# Patient Record
Sex: FEMALE | Race: BLACK OR AFRICAN AMERICAN | Employment: FULL TIME | URBAN - METROPOLITAN AREA
[De-identification: names, ages, dates, MRNs, and addresses within clinical notes are randomized per-mention and may not be internally consistent; named-entity substitution may affect disease eponyms.]

---

## 2017-03-29 ENCOUNTER — ANESTHESIA (OUTPATIENT)
Dept: SURGERY | Age: 52
End: 2017-03-29
Payer: COMMERCIAL

## 2017-03-29 ENCOUNTER — HOSPITAL ENCOUNTER (OUTPATIENT)
Age: 52
Setting detail: OUTPATIENT SURGERY
Discharge: HOME | End: 2017-03-29
Attending: ORTHOPAEDIC SURGERY | Admitting: ORTHOPAEDIC SURGERY
Payer: COMMERCIAL

## 2017-03-29 ENCOUNTER — ANESTHESIA EVENT (OUTPATIENT)
Dept: SURGERY | Age: 52
End: 2017-03-29
Payer: COMMERCIAL

## 2017-03-29 VITALS
OXYGEN SATURATION: 93 % | BODY MASS INDEX: 32.75 KG/M2 | SYSTOLIC BLOOD PRESSURE: 115 MMHG | RESPIRATION RATE: 16 BRPM | WEIGHT: 209.13 LBS | TEMPERATURE: 97.9 F | HEART RATE: 77 BPM | DIASTOLIC BLOOD PRESSURE: 79 MMHG

## 2017-03-29 LAB
HCG UR QL: NEGATIVE
POTASSIUM BLD-SCNC: 3.6 MMOL/L (ref 3.5–5.1)

## 2017-03-29 PROCEDURE — 76942 ECHO GUIDE FOR BIOPSY: CPT | Performed by: ORTHOPAEDIC SURGERY

## 2017-03-29 PROCEDURE — 81025 URINE PREGNANCY TEST: CPT

## 2017-03-29 PROCEDURE — 77030019940 HC BLNKT HYPOTHRM STRY -B: Performed by: ANESTHESIOLOGY

## 2017-03-29 PROCEDURE — 76060000032 HC ANESTHESIA 0.5 TO 1 HR: Performed by: ORTHOPAEDIC SURGERY

## 2017-03-29 PROCEDURE — 76210000063 HC OR PH I REC FIRST 0.5 HR: Performed by: ORTHOPAEDIC SURGERY

## 2017-03-29 PROCEDURE — 77030020754 HC CUF TRNQT 2BLA STRY -B: Performed by: ORTHOPAEDIC SURGERY

## 2017-03-29 PROCEDURE — 77030003602 HC NDL NRV BLK BBMI -B: Performed by: ANESTHESIOLOGY

## 2017-03-29 PROCEDURE — 77030013921: Performed by: ORTHOPAEDIC SURGERY

## 2017-03-29 PROCEDURE — 76210000020 HC REC RM PH II FIRST 0.5 HR: Performed by: ORTHOPAEDIC SURGERY

## 2017-03-29 PROCEDURE — 74011250636 HC RX REV CODE- 250/636: Performed by: ANESTHESIOLOGY

## 2017-03-29 PROCEDURE — 74011250636 HC RX REV CODE- 250/636: Performed by: ORTHOPAEDIC SURGERY

## 2017-03-29 PROCEDURE — 76010010054 HC POST OP PAIN BLOCK: Performed by: ORTHOPAEDIC SURGERY

## 2017-03-29 PROCEDURE — 84132 ASSAY OF SERUM POTASSIUM: CPT

## 2017-03-29 PROCEDURE — 77030002933 HC SUT MCRYL J&J -A: Performed by: ORTHOPAEDIC SURGERY

## 2017-03-29 PROCEDURE — 77030002916 HC SUT ETHLN J&J -A: Performed by: ORTHOPAEDIC SURGERY

## 2017-03-29 PROCEDURE — 74011250636 HC RX REV CODE- 250/636

## 2017-03-29 PROCEDURE — 77030011640 HC PAD GRND REM COVD -A: Performed by: ORTHOPAEDIC SURGERY

## 2017-03-29 PROCEDURE — 77030018836 HC SOL IRR NACL ICUM -A: Performed by: ORTHOPAEDIC SURGERY

## 2017-03-29 PROCEDURE — 76010000160 HC OR TIME 0.5 TO 1 HR INTENSV-TIER 1: Performed by: ORTHOPAEDIC SURGERY

## 2017-03-29 RX ORDER — ROPIVACAINE HYDROCHLORIDE 5 MG/ML
INJECTION, SOLUTION EPIDURAL; INFILTRATION; PERINEURAL AS NEEDED
Status: DISCONTINUED | OUTPATIENT
Start: 2017-03-29 | End: 2017-03-29 | Stop reason: HOSPADM

## 2017-03-29 RX ORDER — FENTANYL CITRATE 50 UG/ML
100 INJECTION, SOLUTION INTRAMUSCULAR; INTRAVENOUS ONCE
Status: COMPLETED | OUTPATIENT
Start: 2017-03-29 | End: 2017-03-29

## 2017-03-29 RX ORDER — MIDAZOLAM HYDROCHLORIDE 1 MG/ML
2 INJECTION, SOLUTION INTRAMUSCULAR; INTRAVENOUS ONCE
Status: COMPLETED | OUTPATIENT
Start: 2017-03-29 | End: 2017-03-29

## 2017-03-29 RX ORDER — CEFAZOLIN SODIUM IN 0.9 % NACL 2 G/50 ML
2 INTRAVENOUS SOLUTION, PIGGYBACK (ML) INTRAVENOUS ONCE
Status: COMPLETED | OUTPATIENT
Start: 2017-03-29 | End: 2017-03-29

## 2017-03-29 RX ORDER — OXYCODONE HYDROCHLORIDE 5 MG/1
5 TABLET ORAL
Status: CANCELLED | OUTPATIENT
Start: 2017-03-29

## 2017-03-29 RX ORDER — PROPOFOL 10 MG/ML
INJECTION, EMULSION INTRAVENOUS
Status: DISCONTINUED | OUTPATIENT
Start: 2017-03-29 | End: 2017-03-29 | Stop reason: HOSPADM

## 2017-03-29 RX ORDER — SODIUM CHLORIDE 0.9 % (FLUSH) 0.9 %
5-10 SYRINGE (ML) INJECTION AS NEEDED
Status: CANCELLED | OUTPATIENT
Start: 2017-03-29

## 2017-03-29 RX ORDER — HYDROMORPHONE HYDROCHLORIDE 2 MG/ML
0.5 INJECTION, SOLUTION INTRAMUSCULAR; INTRAVENOUS; SUBCUTANEOUS
Status: CANCELLED | OUTPATIENT
Start: 2017-03-29

## 2017-03-29 RX ORDER — SODIUM CHLORIDE, SODIUM LACTATE, POTASSIUM CHLORIDE, CALCIUM CHLORIDE 600; 310; 30; 20 MG/100ML; MG/100ML; MG/100ML; MG/100ML
75 INJECTION, SOLUTION INTRAVENOUS CONTINUOUS
Status: DISCONTINUED | OUTPATIENT
Start: 2017-03-29 | End: 2017-03-29 | Stop reason: HOSPADM

## 2017-03-29 RX ORDER — OXYCODONE AND ACETAMINOPHEN 10; 325 MG/1; MG/1
1 TABLET ORAL AS NEEDED
Status: CANCELLED | OUTPATIENT
Start: 2017-03-29

## 2017-03-29 RX ADMIN — FENTANYL CITRATE 100 MCG: 50 INJECTION, SOLUTION INTRAMUSCULAR; INTRAVENOUS at 10:54

## 2017-03-29 RX ADMIN — ROPIVACAINE HYDROCHLORIDE 5 ML: 5 INJECTION, SOLUTION EPIDURAL; INFILTRATION; PERINEURAL at 10:59

## 2017-03-29 RX ADMIN — MIDAZOLAM HYDROCHLORIDE 2 MG: 1 INJECTION, SOLUTION INTRAMUSCULAR; INTRAVENOUS at 10:54

## 2017-03-29 RX ADMIN — PROPOFOL 200 MCG/KG/MIN: 10 INJECTION, EMULSION INTRAVENOUS at 11:51

## 2017-03-29 RX ADMIN — SODIUM CHLORIDE, SODIUM LACTATE, POTASSIUM CHLORIDE, AND CALCIUM CHLORIDE 75 ML/HR: 600; 310; 30; 20 INJECTION, SOLUTION INTRAVENOUS at 10:50

## 2017-03-29 RX ADMIN — CEFAZOLIN 2 G: 1 INJECTION, POWDER, FOR SOLUTION INTRAMUSCULAR; INTRAVENOUS; PARENTERAL at 12:00

## 2017-03-29 NOTE — H&P
Outpatient Surgery History and Physical:  Indiana Pollock was seen and examined. CHIEF COMPLAINT:   foot. PE:     Visit Vitals    LMP 01/27/2017       Heart:   Regular rhythm      Lungs:  Are clear      Past Medical History: There are no active problems to display for this patient. Surgical History:   Past Surgical History:   Procedure Laterality Date    HX COLONOSCOPY         Social History: Patient  reports that she has been smoking. She has a 14.50 pack-year smoking history. She has never used smokeless tobacco. She reports that she drinks alcohol. She reports that she does not use illicit drugs. Family History:   Family History   Problem Relation Age of Onset    Heart Disease Mother        Allergies: Reviewed per EMR  Allergies   Allergen Reactions    Lisinopril Unknown (comments)       Medications:    No current facility-administered medications on file prior to encounter. Current Outpatient Prescriptions on File Prior to Encounter   Medication Sig    ASPIRIN LOW DOSE PO 81 mg every morning. Tablet, Oral      hydroxychloroquine (PLAQUENIL) 200 mg tablet Take 200 mg by mouth every morning.  fluticasone (FLONASE) 50 mcg/actuation nasal spray by Nasal route.  levothyroxine (SYNTHROID) 50 mcg tablet Take 50 mcg by mouth Daily (before breakfast).  gabapentin (NEURONTIN) 800 mg tablet Take 800 mg by mouth every morning.  buPROPion XL (WELLBUTRIN XL) 300 mg XL tablet Take 300 mg by mouth every morning.  traZODone (DESYREL) 50 mg tablet Take 50 mg by mouth nightly.  potassium chloride (KLOR-CON) 20 mEq packet Take 20 mEq by mouth.  losartan (COZAAR) 100 mg tablet Take 100 mg by mouth every morning.  amlodipine (NORVASC) 10 mg tablet Take 10 mg by mouth every morning. The surgery is planned for the foot. History and physical has been reviewed. The patient has been examined.  There have been no significant clinical changes since the completion of the originally dated History and Physical.      The patient is here today for outpatient surgery. I have examined the patient, no changes are noted in the patient's medical status. Necessity for the procedure/care is still present and the history and physical above is current. See the office notes for the full long term history of the problem. Please see the recent office notes for the musculoskeletal examination.     Signed By: Mallika Estevez MD     March 29, 2017 10:38 AM

## 2017-03-29 NOTE — ANESTHESIA PROCEDURE NOTES
Peripheral Block    Start time: 3/29/2017 10:54 AM  End time: 3/29/2017 10:57 AM  Performed by: Ivy Watson by: Jermaine Broussarders:    Indications: at surgeon's request and post-op pain management    Preanesthetic Checklist: patient identified, risks and benefits discussed, site marked, timeout performed, anesthesia consent given and patient being monitored    Timeout Time: 10:54          Block Type:   Block Type:  Popliteal  Laterality:  Right  Monitoring:  Standard ASA monitoring, continuous pulse ox, frequent vital sign checks, heart rate, responsive to questions and oxygen  Injection Technique:  Single shot  Procedures: ultrasound guided and nerve stimulator    Patient Position: left lateral decubitus  Prep: chlorhexidine    Needle Type:  Stimuplex  Needle Gauge:  21 G  Needle Localization:  Anatomical landmarks, nerve stimulator and ultrasound guidance  Motor Response: minimal motor response >0.4 mA    Medication Injected:  0.5%  ropivacaine  Volume (mL):  35    Assessment:  Number of attempts:  1  Injection Assessment:  Incremental injection every 5 mL, local visualized surrounding nerve on ultrasound, negative aspiration for blood and no intravascular symptoms  Patient tolerance:  Patient tolerated the procedure well with no immediate complications

## 2017-03-29 NOTE — ANESTHESIA PREPROCEDURE EVALUATION
Anesthetic History               Review of Systems / Medical History  Patient summary reviewed and pertinent labs reviewed    Pulmonary    COPD: mild    Sleep apnea  Smoker         Neuro/Psych   Within defined limits           Cardiovascular    Hypertension: well controlled              Exercise tolerance: >4 METS     GI/Hepatic/Renal     GERD: well controlled           Endo/Other      Hypothyroidism: well controlled  Arthritis     Other Findings              Physical Exam    Airway  Mallampati: II  TM Distance: > 6 cm  Neck ROM: normal range of motion   Mouth opening: Normal     Cardiovascular    Rhythm: regular           Dental    Dentition: Poor dentition     Pulmonary                 Abdominal  GI exam deferred       Other Findings            Anesthetic Plan    ASA: 2  Anesthesia type: total IV anesthesia - popliteal fossa block and saphenous block      Post-op pain plan if not by surgeon: peripheral nerve block single    Induction: Intravenous  Anesthetic plan and risks discussed with: Patient

## 2017-03-29 NOTE — ANESTHESIA POSTPROCEDURE EVALUATION
Post-Anesthesia Evaluation and Assessment    Patient: Mony Kowalski MRN: [id number]  SSN: [id number]    YOB: 1965  Age: 54 y.o. Sex: female       Cardiovascular Function/Vital Signs  Visit Vitals    /79    Pulse 77    Temp 36.6 °C (97.9 °F)    Resp 16    Wt 94.9 kg (209 lb 2 oz)    SpO2 93%    BMI 32.75 kg/m2       Patient is status post total IV anesthesia anesthesia for Procedure(s):  TARSAL TUNNEL RELEASE  RIGHT PLANTAR FASCIA RELEASE/ SPUR EXOSTECTOMY/ .    Nausea/Vomiting: None    Postoperative hydration reviewed and adequate. Pain:  Pain Scale 1: Numeric (0 - 10) (03/29/17 1259)  Pain Intensity 1: 0 (03/29/17 1259)   Managed    Neurological Status:   Neuro (WDL): Exceptions to WDL (03/29/17 1259)  Neuro  Neurologic State: Alert (03/29/17 1259)  LUE Motor Response: Purposeful (03/29/17 1259)  LLE Motor Response: Purposeful (03/29/17 1259)  RUE Motor Response: Purposeful (03/29/17 1259)  RLE Motor Response: Numbness; Pharmacologically paralyzed (03/29/17 1259)   At baseline    Mental Status and Level of Consciousness: Arousable    Pulmonary Status:   O2 Device: Room air (03/29/17 1259)   Adequate oxygenation and airway patent    Complications related to anesthesia: None    Post-anesthesia assessment completed.  No concerns    Signed By: Niko Amado MD     March 29, 2017

## 2017-03-29 NOTE — IP AVS SNAPSHOT
[address] 
[phone] Patient: Emiliano Montenegro MRN: [id number] :1965 You are allergic to the following Allergen Reactions Lisinopril Unknown (comments) Recent Documentation Weight BMI OB Status Smoking Status 94.9 kg 32.75 kg/m2 Premenopausal Current Every Day Smoker Emergency Contacts Name Discharge Info Relation Home Work Mobile Idania Sharma  Parent [1] [phone] About your hospitalization You were admitted on:  2017 You last received care in the:  [facility] OP PACU You were discharged on:  2017 Unit phone number:  [phone] Why you were hospitalized Your primary diagnosis was:  Not on File Providers Seen During Your Hospitalizations Provider Role Specialty Primary office phone Xin Ortez MD Attending Provider Orthopedic Surgery [phone] Your Primary Care Physician (PCP) Primary Care Physician Office Phone Office Fax Florian Phillips [phone] Follow-up Information None Current Discharge Medication List  
  
ASK your doctor about these medications Dose & Instructions Dispensing Information Comments Morning Noon Evening Bedtime ASPIRIN LOW DOSE PO Your last dose was: Your next dose is:    
   
   
 Dose:  81 mg  
81 mg every morning. Tablet, Oral  
 Refills:  0  
     
   
   
   
  
 FLONASE 50 mcg/actuation nasal spray Generic drug:  fluticasone Your last dose was: Your next dose is:    
   
   
 by Nasal route. Refills:  0  
     
   
   
   
  
 gabapentin 800 mg tablet Commonly known as:  NEURONTIN Your last dose was: Your next dose is:    
   
   
 Dose:  800 mg Take 800 mg by mouth every morning. Refills:  0  KLOR-CON 20 mEq packet Generic drug:  potassium chloride Your last dose was: Your next dose is:    
   
   
 Dose:  20 mEq Take 20 mEq by mouth. Refills:  0  
     
   
   
   
  
 losartan 100 mg tablet Commonly known as:  COZAAR Your last dose was: Your next dose is:    
   
   
 Dose:  100 mg Take 100 mg by mouth every morning. Refills:  0 NORVASC 10 mg tablet Generic drug:  amLODIPine Your last dose was: Your next dose is:    
   
   
 Dose:  10 mg Take 10 mg by mouth every morning. Refills:  0  
     
   
   
   
  
 PLAQUENIL 200 mg tablet Generic drug:  hydroxychloroquine Your last dose was: Your next dose is:    
   
   
 Dose:  200 mg Take 200 mg by mouth every morning. Refills:  0  
     
   
   
   
  
 synthroid 50 mcg tablet Generic drug:  levothyroxine Your last dose was: Your next dose is:    
   
   
 Dose:  50 mcg Take 50 mcg by mouth Daily (before breakfast). Refills:  0  
     
   
   
   
  
 traZODone 50 mg tablet Commonly known as:  Oletta Justa Your last dose was: Your next dose is:    
   
   
 Dose:  50 mg Take 50 mg by mouth nightly. Refills:  0 WELLBUTRIN  mg XL tablet Generic drug:  buPROPion XL Your last dose was: Your next dose is:    
   
   
 Dose:  300 mg Take 300 mg by mouth every morning. Refills:  0 Discharge Instructions INSTRUCTIONS FOLLOWING FOOT SURGERY 
 
ACTIVITY Elevate foot. No Ice No weight bearing. Use crutches or knee walker until seen in follow up appointment Don't put anything into the splint to relieve itching etc. Take one 325mg aspirin daily if okay with your medical doctor and you have no GI ulcer. Get up and out of bed frequently. While in bed move the legs as much as possible) DIET Day of Surgery: Clear liquids until no nausea or vomiting; then light, bland diet (Baked chicken, plain rice, grits, scrambled egg, toast). Nothing Greasy, fried or spicy today Advance to regular diet on second day, unless your doctor orders otherwise. PAIN Take pain medications as directed by your doctor. Call your doctor if pain is NOT relieved by medication. PAIN MED SIDE EFFECTS Constipation: Lots of fluids, try prune juice, then OTC stool softeners if necessary Nausea: Take medication with food. DRESSING CARE Keep dry and in place until follow up appointment CALL YOUR DOCTOR IF YOU HAVE Excessive bleeding that does not stop after holding mild pressure over the area. Temperature of 101 degrees or above. Redness, excessive swelling or bruising, and/or green or yellow, smelly discharge from incision. Loss of sensation - cold, white or blue toes. AFTER ANESTHESIA For the first 24 hours and while taking narcotics for pain: DO NOT Drive, Drink Alcoholic beverages, or make important Decisions. Be aware of dizziness following anesthesia and while taking pain medication. Preventing Infection at Home We care about preventing infection and avoiding the spread of germs  not only when you are in the hospital but also when you return home. When you return home from the hospital, its important to take the following steps to help prevent infection and avoid spreading germs that could infect you and others. Ask everyone in your home to follow these guidelines, too. Clean Your Hands · Clean your hands whenever your hands are visibly dirty, before you eat, before or after touching your mouth, nose or eyes, and before preparing food. Clean them after contact with body fluids, using the restroom, touching animals or changing diapers. · When washing hands, wet them with warm water and work up a lather. Rub hands for at least 15 seconds, then rinse them and pat them dry with a clean towel or paper towel.  
· When using hand sanitizers, it should take about 15 seconds to rub your hands dry. If not, you probably didnt apply enough . Cover Your Sneeze or Cough Germs are released into the air whenever you sneeze or cough. To prevent the spread of infection: · Turn away from other people before coughing or sneezing. · Cover your mouth or nose with a tissue when you cough or sneeze. Put the tissue in the trash. · If you dont have a tissue, cough or sneeze into your upper sleeve, not your hands. · Always clean your hands after coughing or sneezing. Care for Wounds Your skin is your bodys first line of defense against germs, but an open wound leaves an easy way for germs to enter your body. To prevent infection: · Clean your hands before and after changing wound dressings, and wear gloves to change dressings if recommended by your doctor. · Take special care with IV lines or other devices inserted into the body. If you must touch them, clean your hands first. 
· Follow any specific instructions from your doctor to care for your wounds. Contact your doctor if you experience any signs of infection, such as fever or increased redness at the surgical or wound site. Keep a [address] · Clean or wipe commonly touched hard surfaces like door handles, sinks, tabletops, phones and TV remotes. · Use products labeled disinfectant to kill harmful bacteria and viruses. · Use a clean cloth or paper towel to clean and dry surfaces. Wiping surfaces with a dirty dishcloth, sponge or towel will only spread germs. · Never share toothbrushes, sifuentes, drinking glasses, utensils, razor blades, face cloths or bath towels to avoid spreading germs. · Be sure that the linens that you sleep on are clean. · Keep pets away from wounds and wash your hands after touching pets, their toys or bedding. We care about you and your health. Remember, preventing infections is a team effort between you, your family, friends and health care providers. APPOINTMENT DATE/TIME Keep as scheduled YOUR DOCTOR'S PHONE NUMBER [phone] DISCHARGE SUMMARY from Nurse PATIENT INSTRUCTIONS: 
 
After general anesthesia or intravenous sedation, for 24 hours or while taking prescription Narcotics: · Limit your activities · Do not drive and operate hazardous machinery · Do not make important personal or business decisions · Do  not drink alcoholic beverages · If you have not urinated within 8 hours after discharge, please contact your surgeon on call. *  Please give a list of your current medications to your Primary Care Provider. *  Please update this list whenever your medications are discontinued, doses are 
    changed, or new medications (including over-the-counter products) are added. *  Please carry medication information at all times in case of emergency situations. These are general instructions for a healthy lifestyle: No smoking/ No tobacco products/ Avoid exposure to second hand smoke Surgeon General's Warning:  Quitting smoking now greatly reduces serious risk to your health. Obesity, smoking, and sedentary lifestyle greatly increases your risk for illness A healthy diet, regular physical exercise & weight monitoring are important for maintaining a healthy lifestyle You may be retaining fluid if you have a history of heart failure or if you experience any of the following symptoms:  Weight gain of 3 pounds or more overnight or 5 pounds in a week, increased swelling in our hands or feet or shortness of breath while lying flat in bed. Please call your doctor as soon as you notice any of these symptoms; do not wait until your next office visit. Recognize signs and symptoms of STROKE: 
 
F-face looks uneven A-arms unable to move or move unevenly S-speech slurred or non-existent T-time-call 911 as soon as signs and symptoms begin-DO NOT go Back to bed or wait to see if you get better-TIME IS BRAIN. Discharge Orders None Introducing [facility]! [employer] [place] introduces [employer] patient portal. Now you can access parts of your medical record, email your doctor's office, and request medication refills online. 1. In your internet browser, go to [email or web address] 2. Click on the First Time User? Click Here link in the Sign In box. You will see the New Member Sign Up page. 3. Enter your [employer] Access Code exactly as it appears below. You will not need to use this code after youve completed the sign-up process. If you do not sign up before the expiration date, you must request a new code. · [employer] Access Code: [email or web address] Expires: 6/19/2017  1:50 PM 
 
4. Enter the last four digits of your Social Security Number (xxxx) and Date of Birth (mm/dd/yyyy) as indicated and click Submit. You will be taken to the next sign-up page. 5. Create a [employer] ID. This will be your [employer] login ID and cannot be changed, so think of one that is secure and easy to remember. 6. Create a [employer] password. You can change your password at any time. 7. Enter your Password Reset Question and Answer. This can be used at a later time if you forget your password. 8. Enter your e-mail address. You will receive e-mail notification when new information is available in [address] 9. Click Sign Up. You can now view and download portions of your medical record. 10. Click the Download Summary menu link to download a portable copy of your medical information. If you have questions, please visit the Frequently Asked Questions section of the [employer] website. Remember, [employer] is NOT to be used for urgent needs. For medical emergencies, dial 911. Now available from your iPhone and Android! General Information Please provide this summary of care documentation to your next provider.  
  
  
    
    
 Patient Signature: ____________________________________________________________ Date:  ____________________________________________________________  
  
Justo Sport Provider Signature:  ____________________________________________________________ Date:  ____________________________________________________________

## 2017-03-29 NOTE — OP NOTES
[address] [facility]   OPERATIVE REPORT       Name:  Rodrigo Sommers   MR#:  [id number]   :  1965   Account #:  [de-identified]   Date of Adm:  2017       PREOPERATIVE DIAGNOSES   1. Right tarsal tunnel syndrome. 2. Right plantar fasciitis with plantar heel spur. POSTOPERATIVE DIAGNOSES   1. Right tarsal tunnel syndrome. 2. Right plantar fasciitis with plantar heel spur. PROCEDURES   1. Right tarsal tunnel release. 2. Right plantar fascia release with plantar heel exostectomy. SURGEON: Daniel Holder MD    ANESTHESIA: Regional.    FLUIDS: Crystalloid. ESTIMATED BLOOD LOSS: Minimal.    SPECIMENS: None. DRAINS: None. COMPLICATIONS: None. TOURNIQUET TIME: Less than an hour. FINDINGS INTRAOPERATIVELY: The patient had significant   tightening around the tarsal tunnel, beginning about 1 cm   proximal to the abductor fascia and extending throughout the   abductor fascia. This encompassed the entire tarsal tunnel, as   well as the first branch of the lateral plantar nerve. SURGERY IN DETAIL: After successful induction of regional   anesthesia, the right leg was scrubbed, prepped, and draped in   the usual sterile fashion. Antibiotic issued. Time out procedure   and identification of surgical markings were done by me. The   right leg was addressed with a medial approach. Findings as   noted above were seen. I was able to do a complete tarsal tunnel   release and complete plantar fascia release through proximal and   distal incisions. The plantar heel had a small spur present and   this was resected. The wound was thoroughly cleaned and closed   with Monocryl and Ethilon. The patient was placed in a sterile   dressing and seemed to tolerate the procedures well.         MD HELGA Guadarrama / ADRIANA   D:  2017   12:47   T:  2017   14:21   Job #:  [id number]

## 2017-03-29 NOTE — DISCHARGE INSTRUCTIONS
INSTRUCTIONS FOLLOWING FOOT SURGERY    ACTIVITY  Elevate foot. No Ice    No weight bearing. Use crutches or knee walker until seen in follow up appointment  Don't put anything into the splint to relieve itching etc. Take one 325mg aspirin daily if okay with your medical doctor and you have no GI ulcer. Get up and out of bed frequently. While in bed move the legs as much as possible)    DIET  Day of Surgery: Clear liquids until no nausea or vomiting; then light, bland diet (Baked chicken, plain rice, grits, scrambled egg, toast). Nothing Greasy, fried or spicy today  Advance to regular diet on second day, unless your doctor orders otherwise. PAIN  Take pain medications as directed by your doctor. Call your doctor if pain is NOT relieved by medication. PAIN MED SIDE EFFECTS  Constipation: Lots of fluids, try prune juice, then OTC stool softeners if necessary  Nausea: Take medication with food. DRESSING CARE Keep dry and in place until follow up appointment    CALL YOUR DOCTOR IF YOU HAVE  Excessive bleeding that does not stop after holding mild pressure over the area. Temperature of 101 degrees or above. Redness, excessive swelling or bruising, and/or green or yellow, smelly discharge from incision. Loss of sensation - cold, white or blue toes. AFTER ANESTHESIA  For the first 24 hours and while taking narcotics for pain: DO NOT Drive, Drink Alcoholic beverages, or make important Decisions. Be aware of dizziness following anesthesia and while taking pain medication. Preventing Infection at Home  We care about preventing infection and avoiding the spread of germs - not only when you are in the hospital but also when you return home. When you return home from the hospital, its important to take the following steps to help prevent infection and avoid spreading germs that could infect you and others. Ask everyone in your home to follow these guidelines, too.     Clean Your Hands  · Clean your hands whenever your hands are visibly dirty, before you eat, before or after touching your mouth, nose or eyes, and before preparing food. Clean them after contact with body fluids, using the restroom, touching animals or changing diapers. · When washing hands, wet them with warm water and work up a lather. Rub hands for at least 15 seconds, then rinse them and pat them dry with a clean towel or paper towel. · When using hand sanitizers, it should take about 15 seconds to rub your hands dry. If not, you probably didnt apply enough . Cover Your Sneeze or Cough  Germs are released into the air whenever you sneeze or cough. To prevent the spread of infection:  · Turn away from other people before coughing or sneezing. · Cover your mouth or nose with a tissue when you cough or sneeze. Put the tissue in the trash. · If you dont have a tissue, cough or sneeze into your upper sleeve, not your hands. · Always clean your hands after coughing or sneezing. Care for Wounds  Your skin is your bodys first line of defense against germs, but an open wound leaves an easy way for germs to enter your body. To prevent infection:  · Clean your hands before and after changing wound dressings, and wear gloves to change dressings if recommended by your doctor. · Take special care with IV lines or other devices inserted into the body. If you must touch them, clean your hands first.  · Follow any specific instructions from your doctor to care for your wounds. Contact your doctor if you experience any signs of infection, such as fever or increased redness at the surgical or wound site. Keep a Clean Home  · Clean or wipe commonly touched hard surfaces like door handles, sinks, tabletops, phones and TV remotes. · Use products labeled disinfectant to kill harmful bacteria and viruses. · Use a clean cloth or paper towel to clean and dry surfaces.  Wiping surfaces with a dirty dishcloth, sponge or towel will only spread germs.  · Never share toothbrushes, sifuentes, drinking glasses, utensils, razor blades, face cloths or bath towels to avoid spreading germs. · Be sure that the linens that you sleep on are clean. · Keep pets away from wounds and wash your hands after touching pets, their toys or bedding. We care about you and your health. Remember, preventing infections is a team effort between you, your family, friends and health care providers. APPOINTMENT DATE/TIME Keep as scheduled    YOUR DOCTOR'S PHONE NUMBER [phone]      DISCHARGE SUMMARY from Nurse    PATIENT INSTRUCTIONS:    After general anesthesia or intravenous sedation, for 24 hours or while taking prescription Narcotics:  · Limit your activities  · Do not drive and operate hazardous machinery  · Do not make important personal or business decisions  · Do  not drink alcoholic beverages  · If you have not urinated within 8 hours after discharge, please contact your surgeon on call. *  Please give a list of your current medications to your Primary Care Provider. *  Please update this list whenever your medications are discontinued, doses are      changed, or new medications (including over-the-counter products) are added. *  Please carry medication information at all times in case of emergency situations. These are general instructions for a healthy lifestyle:    No smoking/ No tobacco products/ Avoid exposure to second hand smoke    Surgeon General's Warning:  Quitting smoking now greatly reduces serious risk to your health.     Obesity, smoking, and sedentary lifestyle greatly increases your risk for illness    A healthy diet, regular physical exercise & weight monitoring are important for maintaining a healthy lifestyle    You may be retaining fluid if you have a history of heart failure or if you experience any of the following symptoms:  Weight gain of 3 pounds or more overnight or 5 pounds in a week, increased swelling in our hands or feet or shortness of breath while lying flat in bed. Please call your doctor as soon as you notice any of these symptoms; do not wait until your next office visit. Recognize signs and symptoms of STROKE:    F-face looks uneven    A-arms unable to move or move unevenly    S-speech slurred or non-existent    T-time-call 911 as soon as signs and symptoms begin-DO NOT go       Back to bed or wait to see if you get better-TIME IS BRAIN.

## 2019-11-15 ENCOUNTER — HOSPITAL ENCOUNTER (EMERGENCY)
Age: 54
Discharge: HOME | End: 2019-11-15
Attending: EMERGENCY MEDICINE
Payer: SELF-PAY

## 2019-11-15 ENCOUNTER — APPOINTMENT (OUTPATIENT)
Dept: GENERAL RADIOLOGY | Age: 54
End: 2019-11-15
Attending: NURSE PRACTITIONER
Payer: SELF-PAY

## 2019-11-15 VITALS
WEIGHT: 200 LBS | SYSTOLIC BLOOD PRESSURE: 151 MMHG | HEART RATE: 78 BPM | DIASTOLIC BLOOD PRESSURE: 89 MMHG | OXYGEN SATURATION: 96 % | TEMPERATURE: 98.6 F | BODY MASS INDEX: 31.39 KG/M2 | RESPIRATION RATE: 16 BRPM | HEIGHT: 67 IN

## 2019-11-15 DIAGNOSIS — S81.801A OPEN WOUND OF RIGHT LOWER EXTREMITY, INITIAL ENCOUNTER: Primary | ICD-10-CM

## 2019-11-15 DIAGNOSIS — L03.115 CELLULITIS OF RIGHT LOWER EXTREMITY: ICD-10-CM

## 2019-11-15 LAB
ALBUMIN SERPL-MCNC: 2.8 G/DL (ref 3.5–5)
ALBUMIN/GLOB SERPL: 0.7 {RATIO} (ref 1.2–3.5)
ALP SERPL-CCNC: 78 U/L (ref 50–136)
ALT SERPL-CCNC: 18 U/L (ref 12–65)
ANION GAP SERPL CALC-SCNC: 5 MMOL/L (ref 7–16)
AST SERPL-CCNC: 18 U/L (ref 15–37)
BASOPHILS # BLD: 0 K/UL (ref 0–0.2)
BASOPHILS NFR BLD: 0 % (ref 0–2)
BILIRUB SERPL-MCNC: 0.5 MG/DL (ref 0.2–1.1)
BUN SERPL-MCNC: 18 MG/DL (ref 6–23)
CALCIUM SERPL-MCNC: 8 MG/DL (ref 8.3–10.4)
CHLORIDE SERPL-SCNC: 111 MMOL/L (ref 98–107)
CO2 SERPL-SCNC: 28 MMOL/L (ref 21–32)
CREAT SERPL-MCNC: 1.18 MG/DL (ref 0.6–1)
DIFFERENTIAL METHOD BLD: NORMAL
EOSINOPHIL # BLD: 0.1 K/UL (ref 0–0.8)
EOSINOPHIL NFR BLD: 1 % (ref 0.5–7.8)
ERYTHROCYTE [DISTWIDTH] IN BLOOD BY AUTOMATED COUNT: 13 % (ref 11.9–14.6)
GLOBULIN SER CALC-MCNC: 4.1 G/DL (ref 2.3–3.5)
GLUCOSE SERPL-MCNC: 111 MG/DL (ref 65–100)
HCT VFR BLD AUTO: 41.2 % (ref 35.8–46.3)
HGB BLD-MCNC: 13.2 G/DL (ref 11.7–15.4)
IMM GRANULOCYTES # BLD AUTO: 0 K/UL (ref 0–0.5)
IMM GRANULOCYTES NFR BLD AUTO: 0 % (ref 0–5)
LACTATE BLD-SCNC: 0.9 MMOL/L (ref 0.5–1.9)
LYMPHOCYTES # BLD: 1 K/UL (ref 0.5–4.6)
LYMPHOCYTES NFR BLD: 17 % (ref 13–44)
MCH RBC QN AUTO: 27.6 PG (ref 26.1–32.9)
MCHC RBC AUTO-ENTMCNC: 32 G/DL (ref 31.4–35)
MCV RBC AUTO: 86.2 FL (ref 79.6–97.8)
MONOCYTES # BLD: 0.4 K/UL (ref 0.1–1.3)
MONOCYTES NFR BLD: 6 % (ref 4–12)
NEUTS SEG # BLD: 4.5 K/UL (ref 1.7–8.2)
NEUTS SEG NFR BLD: 75 % (ref 43–78)
NRBC # BLD: 0 K/UL (ref 0–0.2)
PLATELET # BLD AUTO: 231 K/UL (ref 150–450)
PMV BLD AUTO: 12.1 FL (ref 9.4–12.3)
POTASSIUM SERPL-SCNC: 3 MMOL/L (ref 3.5–5.1)
PROT SERPL-MCNC: 6.9 G/DL (ref 6.3–8.2)
RBC # BLD AUTO: 4.78 M/UL (ref 4.05–5.2)
SODIUM SERPL-SCNC: 144 MMOL/L (ref 136–145)
WBC # BLD AUTO: 6 K/UL (ref 4.3–11.1)

## 2019-11-15 PROCEDURE — 83605 ASSAY OF LACTIC ACID: CPT

## 2019-11-15 PROCEDURE — 73590 X-RAY EXAM OF LOWER LEG: CPT

## 2019-11-15 PROCEDURE — 74011250637 HC RX REV CODE- 250/637: Performed by: NURSE PRACTITIONER

## 2019-11-15 PROCEDURE — 85025 COMPLETE CBC W/AUTO DIFF WBC: CPT

## 2019-11-15 PROCEDURE — 80053 COMPREHEN METABOLIC PANEL: CPT

## 2019-11-15 PROCEDURE — 99283 EMERGENCY DEPT VISIT LOW MDM: CPT | Performed by: NURSE PRACTITIONER

## 2019-11-15 RX ORDER — CLINDAMYCIN HYDROCHLORIDE 150 MG/1
300 CAPSULE ORAL 3 TIMES DAILY
Qty: 42 CAP | Refills: 0 | Status: SHIPPED | OUTPATIENT
Start: 2019-11-15 | End: 2019-11-22

## 2019-11-15 RX ORDER — TRAMADOL HYDROCHLORIDE 50 MG/1
50 TABLET ORAL
Qty: 12 TAB | Refills: 0 | Status: SHIPPED | OUTPATIENT
Start: 2019-11-15 | End: 2019-11-18

## 2019-11-15 RX ORDER — HYDROCODONE BITARTRATE AND ACETAMINOPHEN 5; 325 MG/1; MG/1
1 TABLET ORAL
Status: COMPLETED | OUTPATIENT
Start: 2019-11-15 | End: 2019-11-15

## 2019-11-15 RX ADMIN — HYDROCODONE BITARTRATE AND ACETAMINOPHEN 1 TABLET: 5; 325 TABLET ORAL at 13:47

## 2019-11-15 NOTE — ED TRIAGE NOTES
Pt ambulatory to triage without complications. Pt states went to urgent care for a recheck of a wound to the right lower leg. Pt states the packing came out and the area is not any better and sent her for IV ABT. Pt is not febrile or tachycardic. Pt denies fevers and reports bodyaches only.

## 2019-11-15 NOTE — DISCHARGE INSTRUCTIONS
Change your dressing 2 times a day. Wound care will follow up with your regarding management of the wound. Clindamycin as prescribed. Return to the emergency department for any new or worsening symptoms.

## 2019-11-15 NOTE — ED PROVIDER NOTES
Patient presents with an open wound to her right lower leg. She states she had an abscess opened 3 days ago. She states she was started on keflex. She states she returned to urgent care and had packing removed. She states the provider was concerned that she had a \"blood infection\". Patient states she was sent to the ED for IV antibiotics. She denies fever. The history is provided by the patient. Past Medical History:   Diagnosis Date    Arthritis     Chronic obstructive pulmonary disease (HCC)     no meds    GERD (gastroesophageal reflux disease)     no meds    Hypertension     Kidney stone     Sleep apnea     does not use cpap    Thyroid disease        Past Surgical History:   Procedure Laterality Date    HX COLONOSCOPY           Family History:   Problem Relation Age of Onset    Heart Disease Mother        Social History     Socioeconomic History    Marital status:      Spouse name: Not on file    Number of children: Not on file    Years of education: Not on file    Highest education level: Not on file   Occupational History    Not on file   Social Needs    Financial resource strain: Not on file    Food insecurity:     Worry: Not on file     Inability: Not on file    Transportation needs:     Medical: Not on file     Non-medical: Not on file   Tobacco Use    Smoking status: Current Every Day Smoker     Packs/day: 0.50     Years: 29.00     Pack years: 14.50    Smokeless tobacco: Never Used    Tobacco comment: \"Start in her 25s and smokes 1/2 ppd. \"   Substance and Sexual Activity    Alcohol use: Yes     Comment: occasionally    Drug use: No    Sexual activity: Not on file   Lifestyle    Physical activity:     Days per week: Not on file     Minutes per session: Not on file    Stress: Not on file   Relationships    Social connections:     Talks on phone: Not on file     Gets together: Not on file     Attends Denominational service: Not on file     Active member of club or organization: Not on file     Attends meetings of clubs or organizations: Not on file     Relationship status: Not on file    Intimate partner violence:     Fear of current or ex partner: Not on file     Emotionally abused: Not on file     Physically abused: Not on file     Forced sexual activity: Not on file   Other Topics Concern    Not on file   Social History Narrative    Not on file         ALLERGIES: Lisinopril    Review of Systems   Constitutional: Negative for fever. Musculoskeletal: Positive for arthralgias. Skin: Positive for color change and wound. Vitals:    11/15/19 1256   BP: 151/89   Pulse: 78   Resp: 16   Temp: 98.6 °F (37 °C)   SpO2: 96%   Weight: 90.7 kg (200 lb)   Height: 5' 7\" (1.702 m)            Physical Exam   Constitutional: She is oriented to person, place, and time. She appears well-developed and well-nourished. No distress. HENT:   Head: Normocephalic and atraumatic. Eyes: Conjunctivae and EOM are normal.   Neck: Normal range of motion. Neck supple. Cardiovascular: Normal rate and regular rhythm. Pulses:       Posterior tibial pulses are 2+ on the right side. Pulmonary/Chest: Effort normal and breath sounds normal.   Musculoskeletal:        Right lower leg: She exhibits tenderness and swelling. She exhibits no deformity and no laceration. Neurological: She is alert and oriented to person, place, and time. Skin: Skin is warm and dry. She is not diaphoretic. There is erythema. Psychiatric: She has a normal mood and affect. Her behavior is normal.   Nursing note and vitals reviewed.      Recent Results (from the past 12 hour(s))   CBC WITH AUTOMATED DIFF    Collection Time: 11/15/19  1:00 PM   Result Value Ref Range    WBC 6.0 4.3 - 11.1 K/uL    RBC 4.78 4.05 - 5.2 M/uL    HGB 13.2 11.7 - 15.4 g/dL    HCT 41.2 35.8 - 46.3 %    MCV 86.2 79.6 - 97.8 FL    MCH 27.6 26.1 - 32.9 PG    MCHC 32.0 31.4 - 35.0 g/dL    RDW 13.0 11.9 - 14.6 %    PLATELET 467 229 - 682 K/uL    MPV 12.1 9.4 - 12.3 FL    ABSOLUTE NRBC 0.00 0.0 - 0.2 K/uL    DF AUTOMATED      NEUTROPHILS 75 43 - 78 %    LYMPHOCYTES 17 13 - 44 %    MONOCYTES 6 4.0 - 12.0 %    EOSINOPHILS 1 0.5 - 7.8 %    BASOPHILS 0 0.0 - 2.0 %    IMMATURE GRANULOCYTES 0 0.0 - 5.0 %    ABS. NEUTROPHILS 4.5 1.7 - 8.2 K/UL    ABS. LYMPHOCYTES 1.0 0.5 - 4.6 K/UL    ABS. MONOCYTES 0.4 0.1 - 1.3 K/UL    ABS. EOSINOPHILS 0.1 0.0 - 0.8 K/UL    ABS. BASOPHILS 0.0 0.0 - 0.2 K/UL    ABS. IMM. GRANS. 0.0 0.0 - 0.5 K/UL   POC LACTIC ACID    Collection Time: 11/15/19  1:02 PM   Result Value Ref Range    Lactic Acid (POC) 0.90 0.5 - 1.9 mmol/L   METABOLIC PANEL, COMPREHENSIVE    Collection Time: 11/15/19  1:59 PM   Result Value Ref Range    Sodium 144 136 - 145 mmol/L    Potassium 3.0 (L) 3.5 - 5.1 mmol/L    Chloride 111 (H) 98 - 107 mmol/L    CO2 28 21 - 32 mmol/L    Anion gap 5 (L) 7 - 16 mmol/L    Glucose 111 (H) 65 - 100 mg/dL    BUN 18 6 - 23 MG/DL    Creatinine 1.18 (H) 0.6 - 1.0 MG/DL    GFR est AA >60 >60 ml/min/1.73m2    GFR est non-AA 51 (L) >60 ml/min/1.73m2    Calcium 8.0 (L) 8.3 - 10.4 MG/DL    Bilirubin, total 0.5 0.2 - 1.1 MG/DL    ALT (SGPT) 18 12 - 65 U/L    AST (SGOT) 18 15 - 37 U/L    Alk. phosphatase 78 50 - 136 U/L    Protein, total 6.9 6.3 - 8.2 g/dL    Albumin 2.8 (L) 3.5 - 5.0 g/dL    Globulin 4.1 (H) 2.3 - 3.5 g/dL    A-G Ratio 0.7 (L) 1.2 - 3.5       Xr Tib/fib Rt    Result Date: 11/15/2019  RIGHT TIB-FIB 2 view(s). HISTORY: Leg wound proximal. TECHNIQUE: AP and lateral and oblique views. COMPARISON: None. FINDINGS: No periostitis or erosions. No abnormal gas collections. No fracture. IMPRESSION: Negative. MDM  Number of Diagnoses or Management Options  Cellulitis of right lower extremity:   Open wound of right lower extremity, initial encounter:   Diagnosis management comments: No acute abnormalities noted on lab results. Xray negative for acute changes. Patient referred to wound care for further management. Prescription for clindamycin given at discharge.         Amount and/or Complexity of Data Reviewed  Clinical lab tests: ordered and reviewed  Tests in the radiology section of CPT®: ordered and reviewed  Tests in the medicine section of CPT®: ordered    Patient Progress  Patient progress: stable         Procedures

## 2019-11-15 NOTE — LETTER
[address] [facility] EMERGENCY DEPT 
[address] [facility] [address] 
[phone] Work/School Note Date: 11/15/2019 To Whom It May concern: 
 
Tamar Cai was seen and treated today in the emergency room by the following provider(s): 
Attending Provider: Te Jones MD 
Nurse Practitioner: SRUTHI Jonas. Tamar Cai {Return to school/sport/work:55996} Sincerely, 
 
 
 
 
Rossy Arboleda RN

## 2019-11-15 NOTE — ED NOTES
I have reviewed discharge instructions with the patient. The patient verbalized understanding. Patient left ED via Discharge Method: ambulatory to Home with (mother). Opportunity for questions and clarification provided. Patient given 2 scripts. To continue your aftercare when you leave the hospital, you may receive an automated call from our care team to check in on how you are doing. This is a free service and part of our promise to provide the best care and service to meet your aftercare needs.  If you have questions, or wish to unsubscribe from this service please call [phone]. Thank you for Choosing our [address] [place] [address] Emergency Department.

## 2019-11-16 ENCOUNTER — HOSPITAL ENCOUNTER (EMERGENCY)
Age: 54
Discharge: HOME | End: 2019-11-16
Attending: EMERGENCY MEDICINE
Payer: SELF-PAY

## 2019-11-16 VITALS
TEMPERATURE: 98.2 F | HEART RATE: 73 BPM | RESPIRATION RATE: 18 BRPM | HEIGHT: 67 IN | BODY MASS INDEX: 31.39 KG/M2 | WEIGHT: 200 LBS | SYSTOLIC BLOOD PRESSURE: 151 MMHG | DIASTOLIC BLOOD PRESSURE: 95 MMHG | OXYGEN SATURATION: 99 %

## 2019-11-16 DIAGNOSIS — H10.33 ACUTE CONJUNCTIVITIS OF BOTH EYES, UNSPECIFIED ACUTE CONJUNCTIVITIS TYPE: Primary | ICD-10-CM

## 2019-11-16 PROCEDURE — 99283 EMERGENCY DEPT VISIT LOW MDM: CPT | Performed by: NURSE PRACTITIONER

## 2019-11-16 RX ORDER — ERYTHROMYCIN 5 MG/G
OINTMENT OPHTHALMIC
Qty: 1 TUBE | Refills: 0 | Status: SHIPPED | OUTPATIENT
Start: 2019-11-16 | End: 2021-04-06 | Stop reason: ALTCHOICE

## 2019-11-16 NOTE — ED NOTES
I have reviewed discharge instructions with the patient. The patient verbalized understanding. Patient left ED via Discharge Method: ambulatory to Home with self. Opportunity for questions and clarification provided. Patient given 1 scripts. To continue your aftercare when you leave the hospital, you may receive an automated call from our care team to check in on how you are doing. This is a free service and part of our promise to provide the best care and service to meet your aftercare needs.  If you have questions, or wish to unsubscribe from this service please call [phone]. Thank you for Choosing our [facility] Emergency Department.

## 2019-11-16 NOTE — ED TRIAGE NOTES
Patient states bilat eye irritation. States itching and drainage began last night. States eyes and eyelids hurt. Also photophobia. No redness or drainage noted.

## 2019-11-16 NOTE — ED PROVIDER NOTES
Patient presents with bilateral eye drainage, bilateral eye itching, and photophobia. She states symptoms started today. She denies trauma to her eyes. She denies changes to her vision. She denies eye pain. The history is provided by the patient. Past Medical History:   Diagnosis Date    Arthritis     Chronic obstructive pulmonary disease (HCC)     no meds    GERD (gastroesophageal reflux disease)     no meds    Hypertension     Kidney stone     Sleep apnea     does not use cpap    Thyroid disease        Past Surgical History:   Procedure Laterality Date    HX COLONOSCOPY           Family History:   Problem Relation Age of Onset    Heart Disease Mother        Social History     Socioeconomic History    Marital status:      Spouse name: Not on file    Number of children: Not on file    Years of education: Not on file    Highest education level: Not on file   Occupational History    Not on file   Social Needs    Financial resource strain: Not on file    Food insecurity:     Worry: Not on file     Inability: Not on file    Transportation needs:     Medical: Not on file     Non-medical: Not on file   Tobacco Use    Smoking status: Current Every Day Smoker     Packs/day: 0.50     Years: 29.00     Pack years: 14.50    Smokeless tobacco: Never Used    Tobacco comment: \"Start in her 25s and smokes 1/2 ppd. \"   Substance and Sexual Activity    Alcohol use: Yes     Comment: occasionally    Drug use: No    Sexual activity: Not on file   Lifestyle    Physical activity:     Days per week: Not on file     Minutes per session: Not on file    Stress: Not on file   Relationships    Social connections:     Talks on phone: Not on file     Gets together: Not on file     Attends Zoroastrian service: Not on file     Active member of club or organization: Not on file     Attends meetings of clubs or organizations: Not on file     Relationship status: Not on file    Intimate partner violence: Fear of current or ex partner: Not on file     Emotionally abused: Not on file     Physically abused: Not on file     Forced sexual activity: Not on file   Other Topics Concern    Not on file   Social History Narrative    Not on file         ALLERGIES: Lisinopril    Review of Systems   Constitutional: Negative for chills and fever. Eyes: Positive for photophobia, discharge, redness and itching. Negative for pain and visual disturbance. Respiratory: Negative for cough. Vitals:    11/16/19 1501   BP: (!) 151/95   Pulse: 73   Resp: 18   Temp: 98.2 °F (36.8 °C)   SpO2: 99%   Weight: 90.7 kg (200 lb)   Height: 5' 7\" (1.702 m)            Physical Exam   Constitutional: She is oriented to person, place, and time. She appears well-developed and well-nourished. No distress. HENT:   Head: Normocephalic and atraumatic. Eyes: EOM are normal. Right eye exhibits discharge. Left eye exhibits discharge. Right conjunctiva is injected. Left conjunctiva is injected. Right eye exhibits no nystagmus. Left eye exhibits no nystagmus. Right pupil is round and reactive. Left pupil is round and reactive. Pupils are equal.   Neck: Normal range of motion. Neck supple. Cardiovascular: Normal rate and regular rhythm. Pulmonary/Chest: Effort normal and breath sounds normal.   Neurological: She is alert and oriented to person, place, and time. Skin: Skin is warm and dry. She is not diaphoretic. Psychiatric: She has a normal mood and affect. Her behavior is normal.   Nursing note and vitals reviewed. MDM  Number of Diagnoses or Management Options  Acute conjunctivitis of both eyes, unspecified acute conjunctivitis type:   Diagnosis management comments: No acute abnormality noted on visual acuity.      Patient Progress  Patient progress: stable         Procedures

## 2019-11-16 NOTE — DISCHARGE INSTRUCTIONS
Medication to both eyes. Follow up with your primary care provider for a recheck if symptoms fail to improve. Return to the emergency department for any new or worsening symptoms.

## 2019-11-18 ENCOUNTER — HOSPITAL ENCOUNTER (EMERGENCY)
Age: 54
Discharge: HOME | End: 2019-11-18
Attending: EMERGENCY MEDICINE
Payer: SELF-PAY

## 2019-11-18 ENCOUNTER — APPOINTMENT (OUTPATIENT)
Dept: GENERAL RADIOLOGY | Age: 54
End: 2019-11-18
Payer: SELF-PAY

## 2019-11-18 VITALS
TEMPERATURE: 98.3 F | RESPIRATION RATE: 18 BRPM | SYSTOLIC BLOOD PRESSURE: 145 MMHG | WEIGHT: 204 LBS | HEART RATE: 75 BPM | BODY MASS INDEX: 32.02 KG/M2 | OXYGEN SATURATION: 98 % | DIASTOLIC BLOOD PRESSURE: 90 MMHG | HEIGHT: 67 IN

## 2019-11-18 DIAGNOSIS — J01.00 ACUTE MAXILLARY SINUSITIS, RECURRENCE NOT SPECIFIED: Primary | ICD-10-CM

## 2019-11-18 PROCEDURE — 70220 X-RAY EXAM OF SINUSES: CPT

## 2019-11-18 PROCEDURE — 74011000250 HC RX REV CODE- 250: Performed by: PHYSICIAN ASSISTANT

## 2019-11-18 PROCEDURE — 99283 EMERGENCY DEPT VISIT LOW MDM: CPT | Performed by: EMERGENCY MEDICINE

## 2019-11-18 RX ORDER — TETRACAINE HYDROCHLORIDE 5 MG/ML
1 SOLUTION OPHTHALMIC
Status: COMPLETED | OUTPATIENT
Start: 2019-11-18 | End: 2019-11-18

## 2019-11-18 RX ORDER — METHYLPREDNISOLONE 4 MG/1
4 TABLET ORAL
Qty: 1 DOSE PACK | Refills: 0 | Status: SHIPPED | OUTPATIENT
Start: 2019-11-18 | End: 2021-04-06 | Stop reason: ALTCHOICE

## 2019-11-18 RX ORDER — AMOXICILLIN 875 MG/1
875 TABLET, FILM COATED ORAL 2 TIMES DAILY
Qty: 20 TAB | Refills: 0 | Status: SHIPPED | OUTPATIENT
Start: 2019-11-18 | End: 2019-12-02

## 2019-11-18 RX ADMIN — TETRACAINE HYDROCHLORIDE 1 DROP: 5 SOLUTION OPHTHALMIC at 17:11

## 2019-11-18 NOTE — ED TRIAGE NOTES
Patient presents with complaints of continued right eye pain. Pt states she was discharged with pink eye and sent with erythromycin, compliant with medication. Patient states she is still photophobic. Pt states eyes remain slightly blurry but it has not worsened since Friday when last seen. Right eye slightly pink. Pt is also requesting a work note.

## 2019-11-18 NOTE — LETTER
[address] [facility] EMERGENCY DEPT 
[address] [facility] [address] 
[phone] Work/School Note Date: 11/18/2019 To Whom It May concern: 
 
Dorothy Santizo was seen and treated today in the emergency room by the following provider(s): 
Attending Provider: Sanna Kim MD 
Physician Assistant: LI Jean. Dorothy Santizo may return to work on 11/20/19. Sincerely, LI Sanchez

## 2019-11-19 NOTE — ED PROVIDER NOTES
Patient is here with sinus pressure, congestion, headache and photophobia. She states she was here on Saturday and diagnosed with conjunctivitis. She was given erythromycin ointment which she has been using bilaterally since then. She states it has helped. She is not having any matting of her eyelashes or itching anymore. She still has sinus pressure and congestion. This is been going on for a while. No nausea or vomiting. No neck pain, chest pain, shortness of breath, abdominal pain, dizziness, weakness, dyspnea on exertion, orthopnea, swelling/tingling or weakness to her arms or legs or other new symptoms. She did ambulate to the stretcher without difficulty and is well-hydrated. The history is provided by the patient. Sinus Pain    This is a new problem. The current episode started more than 1 week ago. The problem has been gradually worsening. There has been no fever. The pain is at a severity of 6/10. The pain is moderate. Associated symptoms include congestion, sinus pressure, rhinorrhea and headaches. Pertinent negatives include no chills, no sweats, no ear pain, no hoarse voice, no sore throat, no swollen glands, no cough, no shortness of breath, no neck pain, no neck pain and no chest pain. She has tried nothing for the symptoms.         Past Medical History:   Diagnosis Date    Arthritis     Chronic obstructive pulmonary disease (HCC)     no meds    GERD (gastroesophageal reflux disease)     no meds    Hypertension     Kidney stone     Sleep apnea     does not use cpap    Thyroid disease        Past Surgical History:   Procedure Laterality Date    HX COLONOSCOPY           Family History:   Problem Relation Age of Onset    Heart Disease Mother        Social History     Socioeconomic History    Marital status:      Spouse name: Not on file    Number of children: Not on file    Years of education: Not on file    Highest education level: Not on file   Occupational History    Not on file   Social Needs    Financial resource strain: Not on file    Food insecurity:     Worry: Not on file     Inability: Not on file    Transportation needs:     Medical: Not on file     Non-medical: Not on file   Tobacco Use    Smoking status: Current Every Day Smoker     Packs/day: 0.50     Years: 29.00     Pack years: 14.50    Smokeless tobacco: Never Used    Tobacco comment: \"Start in her 25s and smokes 1/2 ppd. \"   Substance and Sexual Activity    Alcohol use: Yes     Comment: occasionally    Drug use: No    Sexual activity: Not on file   Lifestyle    Physical activity:     Days per week: Not on file     Minutes per session: Not on file    Stress: Not on file   Relationships    Social connections:     Talks on phone: Not on file     Gets together: Not on file     Attends Advent service: Not on file     Active member of club or organization: Not on file     Attends meetings of clubs or organizations: Not on file     Relationship status: Not on file    Intimate partner violence:     Fear of current or ex partner: Not on file     Emotionally abused: Not on file     Physically abused: Not on file     Forced sexual activity: Not on file   Other Topics Concern    Not on file   Social History Narrative    Not on file         ALLERGIES: Lisinopril    Review of Systems   Constitutional: Negative. Negative for chills. HENT: Positive for congestion, rhinorrhea, sinus pressure and sinus pain. Negative for ear pain, hoarse voice and sore throat. Eyes: Negative. Respiratory: Negative. Negative for cough and shortness of breath. Cardiovascular: Negative. Negative for chest pain. Gastrointestinal: Negative. Genitourinary: Negative. Musculoskeletal: Negative. Negative for neck pain. Skin: Negative. Neurological: Positive for headaches. Psychiatric/Behavioral: Negative. All other systems reviewed and are negative.       Vitals:    11/18/19 1626 11/18/19 1916   BP: (!) 150/97 145/90   Pulse: 71 75   Resp: 16 18   Temp: 98.4 °F (36.9 °C) 98.3 °F (36.8 °C)   SpO2: 97% 98%   Weight: 92.5 kg (204 lb)    Height: 5' 7\" (1.702 m)             Physical Exam   Constitutional: She is oriented to person, place, and time. She appears well-developed and well-nourished. HENT:   Head: Normocephalic and atraumatic. Right Ear: External ear normal.   Left Ear: External ear normal.   Mouth/Throat: Oropharynx is clear and moist.   There is bilateral maxillary sinus pressure to palpation. There is moderate swelling to the nasal turbinates bilaterally no swelling of the posterior pharynx. TM exam is normal.   Eyes: Pupils are equal, round, and reactive to light. Conjunctivae and EOM are normal.   The bilateral conjunctive are normal. PERRLA, EOMI. patient is opening eyes and looking around without difficulty in the light. Neck: Normal range of motion. Neck supple. Cardiovascular: Normal rate, regular rhythm, normal heart sounds and intact distal pulses. Pulmonary/Chest: Effort normal and breath sounds normal.   Abdominal: Soft. Bowel sounds are normal.   Musculoskeletal: Normal range of motion. Neurological: She is alert and oriented to person, place, and time. She has normal reflexes. She displays normal reflexes. No cranial nerve deficit or sensory deficit. She exhibits normal muscle tone. Coordination normal.   Skin: Skin is warm and dry. Psychiatric: She has a normal mood and affect. Her behavior is normal. Judgment and thought content normal.   Nursing note and vitals reviewed.        MDM  Number of Diagnoses or Management Options  Acute maxillary sinusitis, recurrence not specified:      Amount and/or Complexity of Data Reviewed  Tests in the radiology section of CPT®: ordered and reviewed    Risk of Complications, Morbidity, and/or Mortality  Presenting problems: moderate  Diagnostic procedures: moderate  Management options: moderate    Patient Progress  Patient progress: improved Procedures      The patient was observed in the ED. Results Reviewed:  XR SINUSES PARANASAL MIN 3 V   Final Result   IMPRESSION:  UNREMARKABLE EXAMINATION. Patient did not have relief from the tetracaine eyedrops and this does appear to be more related to her sinuses today. She has palpable tenderness to them. I will treat her for sinusitis. She was instructed to continue the erythromycin ointment as directed and finish that since she has started it. She should drink plenty of fluids, rest, a note for work was written for 2 days and she should return to the ED if worsening in any way. She is stable for discharge and ambulatory out of the ER without difficulty. I discussed the results of all labs, procedures, radiographs, and treatments with the patient and available family. Treatment plan is agreed upon and the patient is ready for discharge. All voiced understanding of the discharge plan and medication instructions or changes as appropriate. Questions about treatment in the ED were answered. All were encouraged to return should symptoms worsen or new problems develop.

## 2019-11-19 NOTE — DISCHARGE INSTRUCTIONS
Patient Education        Sinusitis: Care Instructions  Your Care Instructions    Sinusitis is an infection of the lining of the sinus cavities in your head. Sinusitis often follows a cold. It causes pain and pressure in your head and face. In most cases, sinusitis gets better on its own in 1 to 2 weeks. But some mild symptoms may last for several weeks. Sometimes antibiotics are needed. Follow-up care is a key part of your treatment and safety. Be sure to make and go to all appointments, and call your doctor if you are having problems. It's also a good idea to know your test results and keep a list of the medicines you take. How can you care for yourself at home? · Take an over-the-counter pain medicine, such as acetaminophen (Tylenol), ibuprofen (Advil, Motrin), or naproxen (Aleve). Read and follow all instructions on the label. · If the doctor prescribed antibiotics, take them as directed. Do not stop taking them just because you feel better. You need to take the full course of antibiotics. · Be careful when taking over-the-counter cold or flu medicines and Tylenol at the same time. Many of these medicines have acetaminophen, which is Tylenol. Read the labels to make sure that you are not taking more than the recommended dose. Too much acetaminophen (Tylenol) can be harmful. · Breathe warm, moist air from a steamy shower, a hot bath, or a sink filled with hot water. Avoid cold, dry air. Using a humidifier in your home may help. Follow the directions for cleaning the machine. · Use saline (saltwater) nasal washes to help keep your nasal passages open and wash out mucus and bacteria. You can buy saline nose drops at a grocery store or drugstore. Or you can make your own at home by adding 1 teaspoon of salt and 1 teaspoon of baking soda to 2 cups of distilled water. If you make your own, fill a bulb syringe with the solution, insert the tip into your nostril, and squeeze gently. Anthony Cardosos your nose.   · Put a hot, wet towel or a warm gel pack on your face 3 or 4 times a day for 5 to 10 minutes each time. · Try a decongestant nasal spray like oxymetazoline (Afrin). Do not use it for more than 3 days in a row. Using it for more than 3 days can make your congestion worse. When should you call for help? Call your doctor now or seek immediate medical care if:    · You have new or worse swelling or redness in your face or around your eyes.     · You have a new or higher fever.    Watch closely for changes in your health, and be sure to contact your doctor if:    · You have new or worse facial pain.     · The mucus from your nose becomes thicker (like pus) or has new blood in it.     · You are not getting better as expected. Where can you learn more? Go to http://ivana-melvi.info/. Enter D597 in the search box to learn more about \"Sinusitis: Care Instructions. \"  Current as of: October 21, 2018  Content Version: 12.2  © 6547-6680 [employer]. Care instructions adapted under license by [employer] (which disclaims liability or warranty for this information). If you have questions about a medical condition or this instruction, always ask your healthcare professional. [address] any warranty or liability for your use of this information.

## 2019-11-19 NOTE — ED NOTES
I have reviewed discharge instructions with the patient. The patient verbalized understanding. Patient left ED via Discharge Method: ambulatory to Home with self. Opportunity for questions and clarification provided. Patient given 2 scripts. To continue your aftercare when you leave the hospital, you may receive an automated call from our care team to check in on how you are doing. This is a free service and part of our promise to provide the best care and service to meet your aftercare needs.  If you have questions, or wish to unsubscribe from this service please call [phone]. Thank you for Choosing our [facility] Emergency Department.

## 2022-01-03 ENCOUNTER — APPOINTMENT (OUTPATIENT)
Dept: GENERAL RADIOLOGY | Age: 57
End: 2022-01-03
Attending: EMERGENCY MEDICINE
Payer: COMMERCIAL

## 2022-01-03 LAB
ALBUMIN SERPL-MCNC: 3.7 G/DL (ref 3.5–5)
ALBUMIN/GLOB SERPL: 0.8 {RATIO} (ref 1.2–3.5)
ALP SERPL-CCNC: 89 U/L (ref 50–136)
ALT SERPL-CCNC: 21 U/L (ref 12–65)
ANION GAP SERPL CALC-SCNC: 3 MMOL/L (ref 7–16)
AST SERPL-CCNC: 23 U/L (ref 15–37)
BASOPHILS # BLD: 0 K/UL (ref 0–0.2)
BASOPHILS NFR BLD: 0 % (ref 0–2)
BILIRUB SERPL-MCNC: 0.6 MG/DL (ref 0.2–1.1)
BUN SERPL-MCNC: 18 MG/DL (ref 6–23)
CALCIUM SERPL-MCNC: 9.6 MG/DL (ref 8.3–10.4)
CHLORIDE SERPL-SCNC: 108 MMOL/L (ref 98–107)
CO2 SERPL-SCNC: 30 MMOL/L (ref 21–32)
CREAT SERPL-MCNC: 1.38 MG/DL (ref 0.6–1)
DIFFERENTIAL METHOD BLD: NORMAL
EOSINOPHIL # BLD: 0.1 K/UL (ref 0–0.8)
EOSINOPHIL NFR BLD: 1 % (ref 0.5–7.8)
ERYTHROCYTE [DISTWIDTH] IN BLOOD BY AUTOMATED COUNT: 13.1 % (ref 11.9–14.6)
GLOBULIN SER CALC-MCNC: 4.5 G/DL (ref 2.3–3.5)
GLUCOSE SERPL-MCNC: 104 MG/DL (ref 65–100)
HCT VFR BLD AUTO: 41.8 % (ref 35.8–46.3)
HGB BLD-MCNC: 13.2 G/DL (ref 11.7–15.4)
IMM GRANULOCYTES # BLD AUTO: 0 K/UL (ref 0–0.5)
IMM GRANULOCYTES NFR BLD AUTO: 0 % (ref 0–5)
LIPASE SERPL-CCNC: 248 U/L (ref 73–393)
LYMPHOCYTES # BLD: 1 K/UL (ref 0.5–4.6)
LYMPHOCYTES NFR BLD: 14 % (ref 13–44)
MAGNESIUM SERPL-MCNC: 3.1 MG/DL (ref 1.8–2.4)
MCH RBC QN AUTO: 26.5 PG (ref 26.1–32.9)
MCHC RBC AUTO-ENTMCNC: 31.6 G/DL (ref 31.4–35)
MCV RBC AUTO: 83.8 FL (ref 79.6–97.8)
MONOCYTES # BLD: 0.5 K/UL (ref 0.1–1.3)
MONOCYTES NFR BLD: 8 % (ref 4–12)
NEUTS SEG # BLD: 5.2 K/UL (ref 1.7–8.2)
NEUTS SEG NFR BLD: 76 % (ref 43–78)
NRBC # BLD: 0 K/UL (ref 0–0.2)
PLATELET # BLD AUTO: 265 K/UL (ref 150–450)
PMV BLD AUTO: 10.7 FL (ref 9.4–12.3)
POTASSIUM SERPL-SCNC: 4.2 MMOL/L (ref 3.5–5.1)
PROT SERPL-MCNC: 8.2 G/DL (ref 6.3–8.2)
RBC # BLD AUTO: 4.99 M/UL (ref 4.05–5.2)
SODIUM SERPL-SCNC: 141 MMOL/L (ref 136–145)
TROPONIN-HIGH SENSITIVITY: 12.5 PG/ML (ref 0–14)
WBC # BLD AUTO: 6.8 K/UL (ref 4.3–11.1)

## 2022-01-03 PROCEDURE — 83690 ASSAY OF LIPASE: CPT

## 2022-01-03 PROCEDURE — 85025 COMPLETE CBC W/AUTO DIFF WBC: CPT

## 2022-01-03 PROCEDURE — 93005 ELECTROCARDIOGRAM TRACING: CPT | Performed by: EMERGENCY MEDICINE

## 2022-01-03 PROCEDURE — 99285 EMERGENCY DEPT VISIT HI MDM: CPT

## 2022-01-03 PROCEDURE — 94762 N-INVAS EAR/PLS OXIMTRY CONT: CPT

## 2022-01-03 PROCEDURE — 84484 ASSAY OF TROPONIN QUANT: CPT

## 2022-01-03 PROCEDURE — 80053 COMPREHEN METABOLIC PANEL: CPT

## 2022-01-03 PROCEDURE — 83735 ASSAY OF MAGNESIUM: CPT

## 2022-01-03 PROCEDURE — 71046 X-RAY EXAM CHEST 2 VIEWS: CPT

## 2022-01-03 RX ORDER — SODIUM CHLORIDE 0.9 % (FLUSH) 0.9 %
5-10 SYRINGE (ML) INJECTION AS NEEDED
Status: DISCONTINUED | OUTPATIENT
Start: 2022-01-03 | End: 2022-01-04 | Stop reason: HOSPADM

## 2022-01-03 RX ORDER — SODIUM CHLORIDE 0.9 % (FLUSH) 0.9 %
5-10 SYRINGE (ML) INJECTION EVERY 8 HOURS
Status: DISCONTINUED | OUTPATIENT
Start: 2022-01-03 | End: 2022-01-04 | Stop reason: HOSPADM

## 2022-01-03 NOTE — ED TRIAGE NOTES
Pt ambulatory to triage with CO midsternal CP with muscle spams going up her back that started after the CP. Denies cardiac hx. Reports SOB with CP denies nausea.

## 2022-01-04 ENCOUNTER — HOSPITAL ENCOUNTER (EMERGENCY)
Age: 57
Discharge: HOME | End: 2022-01-04
Attending: EMERGENCY MEDICINE
Payer: COMMERCIAL

## 2022-01-04 VITALS
HEIGHT: 67 IN | RESPIRATION RATE: 22 BRPM | WEIGHT: 215 LBS | OXYGEN SATURATION: 98 % | BODY MASS INDEX: 33.74 KG/M2 | TEMPERATURE: 97.6 F | HEART RATE: 71 BPM | SYSTOLIC BLOOD PRESSURE: 151 MMHG | DIASTOLIC BLOOD PRESSURE: 98 MMHG

## 2022-01-04 DIAGNOSIS — M62.838 MUSCLE SPASM: ICD-10-CM

## 2022-01-04 DIAGNOSIS — R07.9 CHEST PAIN, UNSPECIFIED TYPE: Primary | ICD-10-CM

## 2022-01-04 DIAGNOSIS — M54.9 MUSCULOSKELETAL BACK PAIN: ICD-10-CM

## 2022-01-04 LAB
ATRIAL RATE: 69 BPM
ATRIAL RATE: 85 BPM
CALCULATED P AXIS, ECG09: 39 DEGREES
CALCULATED P AXIS, ECG09: 40 DEGREES
CALCULATED R AXIS, ECG10: -35 DEGREES
CALCULATED R AXIS, ECG10: 10 DEGREES
CALCULATED T AXIS, ECG11: 33 DEGREES
CALCULATED T AXIS, ECG11: 38 DEGREES
DIAGNOSIS, 93000: NORMAL
DIAGNOSIS, 93000: NORMAL
P-R INTERVAL, ECG05: 178 MS
P-R INTERVAL, ECG05: 207 MS
Q-T INTERVAL, ECG07: 400 MS
Q-T INTERVAL, ECG07: 486 MS
QRS DURATION, ECG06: 90 MS
QRS DURATION, ECG06: 93 MS
QTC CALCULATION (BEZET), ECG08: 476 MS
QTC CALCULATION (BEZET), ECG08: 525 MS
TROPONIN-HIGH SENSITIVITY: 14.7 PG/ML (ref 0–14)
VENTRICULAR RATE, ECG03: 70 BPM
VENTRICULAR RATE, ECG03: 85 BPM

## 2022-01-04 PROCEDURE — 74011250636 HC RX REV CODE- 250/636: Performed by: EMERGENCY MEDICINE

## 2022-01-04 PROCEDURE — 93005 ELECTROCARDIOGRAM TRACING: CPT | Performed by: EMERGENCY MEDICINE

## 2022-01-04 PROCEDURE — 96374 THER/PROPH/DIAG INJ IV PUSH: CPT

## 2022-01-04 PROCEDURE — 84484 ASSAY OF TROPONIN QUANT: CPT

## 2022-01-04 PROCEDURE — 74011000250 HC RX REV CODE- 250: Performed by: EMERGENCY MEDICINE

## 2022-01-04 PROCEDURE — 74011250637 HC RX REV CODE- 250/637: Performed by: EMERGENCY MEDICINE

## 2022-01-04 RX ORDER — ACETAMINOPHEN 500 MG
1000 TABLET ORAL
Status: COMPLETED | OUTPATIENT
Start: 2022-01-04 | End: 2022-01-04

## 2022-01-04 RX ORDER — KETOROLAC TROMETHAMINE 15 MG/ML
15 INJECTION, SOLUTION INTRAMUSCULAR; INTRAVENOUS
Status: COMPLETED | OUTPATIENT
Start: 2022-01-04 | End: 2022-01-04

## 2022-01-04 RX ADMIN — SODIUM CHLORIDE, PRESERVATIVE FREE 5 ML: 5 INJECTION INTRAVENOUS at 04:00

## 2022-01-04 RX ADMIN — KETOROLAC TROMETHAMINE 15 MG: 15 INJECTION, SOLUTION INTRAMUSCULAR; INTRAVENOUS at 03:59

## 2022-01-04 RX ADMIN — ACETAMINOPHEN 1000 MG: 500 TABLET ORAL at 03:59

## 2022-01-04 NOTE — Clinical Note
[address] [facility] EMERGENCY DEPT   [facility] [id number] [phone]    Work/School Note    Date: 1/3/2022    To Whom It May concern:      Corinna Molina was seen and treated today in the emergency room by the following provider(s):  Attending Provider: Meir Palacios MD.      Corinna Molina is excused from work/school on 01/04/22. She is clear to return to work/school on 01/05/22. Please excuse for January 3, 2022 and January 4, 2022 as her emergency department visit consumed most of those days.     Sincerely,          Ramila Vazquez RN

## 2022-01-04 NOTE — Clinical Note
[address] [facility] EMERGENCY DEPT   [facility] [address] [phone]    Work/School Note    Date: 1/3/2022    To Whom It May concern:      Rodolfo English was seen and treated today in the emergency room by the following provider(s):  Attending Provider: Surendra Husain MD.      Rodolfo English is excused from work/school on 01/04/22. She is clear to return to work/school on 01/05/22. Please excuse for January 3, 2022 and January 4, 2022 as her emergency department visit consumed most of those days.     Sincerely,          Meghana Herman MD

## 2022-01-04 NOTE — ED NOTES
I have reviewed discharge instructions with the patient. The patient verbalized understanding. Patient left ED via Discharge Method: ambulatory to Home with (self). Opportunity for questions and clarification provided. Patient given 0 scripts. To continue your aftercare when you leave the hospital, you may receive an automated call from our care team to check in on how you are doing. This is a free service and part of our promise to provide the best care and service to meet your aftercare needs.  If you have questions, or wish to unsubscribe from this service please call [phone]. Thank you for Choosing our [address] [place] [address] Emergency Department.

## 2022-01-04 NOTE — DISCHARGE INSTRUCTIONS
Tylenol and Motrin for pain. Alternate them every 3-4 hours for best results. Follow-up with your primary care doctor in 3 to 5 days if not improving for follow-up and recheck.   Return if any new, worsening or concerning symptoms peer

## 2022-01-04 NOTE — Clinical Note
[address] [facility] EMERGENCY DEPT   [facility] [address] [phone]    Work/School Note    Date: 1/3/2022    To Whom It May concern:      Yordy Matthews was seen and treated today in the emergency room by the following provider(s):  Attending Provider: Gina Tatum MD.      Yordy Matthews is excused from work/school on 01/04/22. She is clear to return to work/school on 01/05/22. Please excuse for January 3, 2022 and January 4, 2022 as her emergency department visit consumed most of those days.     Sincerely,          Indiana Patel MD

## 2022-01-04 NOTE — ED NOTES
Pt brought into triage for re-assessment and recheck of vital signs. Second troponin drawn. Continues to await bed assignment.
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
## 2022-01-05 ENCOUNTER — HOSPITAL ENCOUNTER (OUTPATIENT)
Dept: PHYSICAL THERAPY | Age: 57
Discharge: HOME | End: 2022-01-05
Attending: PHYSICIAN ASSISTANT
Payer: COMMERCIAL

## 2022-01-05 DIAGNOSIS — M75.112 NONTRAUMATIC INCOMPLETE BILATERAL ROTATOR CUFF TEAR: ICD-10-CM

## 2022-01-05 DIAGNOSIS — M25.512 BILATERAL SHOULDER PAIN, UNSPECIFIED CHRONICITY: ICD-10-CM

## 2022-01-05 DIAGNOSIS — M75.111 NONTRAUMATIC INCOMPLETE BILATERAL ROTATOR CUFF TEAR: ICD-10-CM

## 2022-01-05 DIAGNOSIS — M75.02 ADHESIVE CAPSULITIS OF BOTH SHOULDERS: ICD-10-CM

## 2022-01-05 DIAGNOSIS — M75.01 ADHESIVE CAPSULITIS OF BOTH SHOULDERS: ICD-10-CM

## 2022-01-05 DIAGNOSIS — M25.511 BILATERAL SHOULDER PAIN, UNSPECIFIED CHRONICITY: ICD-10-CM

## 2022-01-05 PROCEDURE — 97162 PT EVAL MOD COMPLEX 30 MIN: CPT

## 2022-01-05 PROCEDURE — 97110 THERAPEUTIC EXERCISES: CPT

## 2022-01-05 NOTE — THERAPY EVALUATION
Franklin Atkinson  : 1965  Primary: [facility]  Secondary:  [address] at On license of [facility] BRODIE GONZALEZ  [address] [facility], [address] [place] [address],[address], [place] [address].  [phone]   [phone]       OUTPATIENT PHYSICAL THERAPY:Initial Assessment 2022     ICD-10: Treatment Diagnosis: Bilateral shoulder pain, unspecified chronicity [M25.511, M25.512] Nontraumatic incomplete bilateral rotator cuff tear [M75.111, M75.112]  Adhesive capsulitis of both shoulders [M75.01, M75.02]  Precautions/Allergies:   Lisinopril   TREATMENT PLAN:  Effective Dates: 2022 TO 3/6/2022 (60 days). Frequency/Duration: 2 times a week for 60 Day(s) MEDICAL/REFERRING DIAGNOSIS:  Bilateral shoulder pain, unspecified chronicity [M25.511, M25.512]  Nontraumatic incomplete bilateral rotator cuff tear [M75.111, M75.112]  Adhesive capsulitis of both shoulders [M75.01, M75.02]   DATE OF ONSET: 4-5 yrs ago  REFERRING PHYSICIAN: LI Irving MD Orders: Eval and treat  Return MD Appointment: no follow up per pt      INITIAL ASSESSMENT:  Ms. Elaina Lujan presents with B shoulder pain. The R is worse and the L has started hurting since she could not use the R. She has marked stiffness of R shoulder and weakness in her shoulder girdle. She is a good candidate for PT for goals listed below. PROBLEM LIST (Impacting functional limitations):  1. Decreased Strength  2. Increased Pain  3. Decreased Activity Tolerance  4. Decreased Flexibility/Joint Mobility  5. Decreased [place] with Home Exercise Program INTERVENTIONS PLANNED: (Treatment may consist of any combination of the following)  6. Home Exercise Program (HEP)  7. Manual Therapy  8. Range of Motion (ROM)  1. Therapeutic Exercise/Strengthening     GOALS: (Goals have been discussed and agreed upon with patient.)  Short-Term Functional Goals: Time Frame: 2 weeks  1. R shoulder PROM improved 10 degrees or greater for elevation  2.   Pt independent with HEP for deficits. 9. Discharge Goals: Time Frame: 6- 8 weeks  1. R shoulder elevation to 1120 actively to aid in ADL's  2. Strength improved in scapular muscles to help with stabilization of humerus in lowering enabling pt to lower arm with less  Pain. 3. Pt to report less pain in her R shoulder. 4. Improve dash by 5 or greater. OUTCOME MEASURE:   Tool Used: Disabilities of the Arm, Shoulder and Hand (DASH) Questionnaire - Quick Version  Score:  Initial: 36/55  Most Recent: X/55 (Date: -- )   Interpretation of Score: The DASH is designed to measure the activities of daily living in person's with upper extremity dysfunction or pain. Each section is scored on a 1-5 scale, 5 representing the greatest disability. The scores of each section are added together for a total score of 55. MEDICAL NECESSITY:   · Patient demonstrates good rehab potential due to higher previous functional level. REASON FOR SERVICES/OTHER COMMENTS:  · Pt has shoulder pain and stiffness that interferes with daily activities. .   Total Duration:       Rehabilitation Potential For Stated Goals: Good  Regarding Anjana Whitaker's therapy, I certify that the treatment plan above will be carried out by a therapist or under their direction. Thank you for this referral,    Jolie Churchill, PT      Referring Physician Signature: LI Dowd _______________________________ Date _____________       PAIN/SUBJECTIVE:   Initial:   0/10  At rest Post Session:  0/10   HISTORY:   History of Injury/Illness (Reason for Referral):  Pt reports R shoulder pain - the front of shoulder and upper arm. She states it occasionally goes down her arm. She also has L shoulder pain which she thinks came on  From using the L one more. She also has numbness in her finger tips on both hands intermittently. Pt has pain with lying on her R or L side. Alleviating factors- Aleve muscle cream, medications.   Aggravating factors lift or reaching overhead  Past Medical History/Comorbidities:   Ms. Raegan Jay  has a past medical history of Arthritis, Chronic obstructive pulmonary disease ([facility] [address] GERD (gastroesophageal reflux disease), Hypertension, Kidney stone, Sjogren's syndrome ([facility] [address] Sleep apnea, and Thyroid disease. She has no past medical history of Adverse effect of anesthesia, Difficult intubation, Malignant hyperthermia due to anesthesia, Nausea & vomiting, or Pseudocholinesterase deficiency. Ms. Raegan Jay  has a past surgical history that includes hx colonoscopy. Social History/Living Environment:    lives alone  Prior Level of Function/Work/Activity:  Last several years she has been able to function but pain has worsened. Pt works as a  for [employer]. Dominant Side:         RIGHT   Ambulatory/Rehab Services H2 Model Falls Risk Assessment   Risk Factors:       No Risk Factors Identified Ability to Rise from Chair:       (0)  Ability to rise in a single movement   Falls Prevention Plan:       No modifications necessary   Total: (5 or greater = High Risk): 1   ©2010 [facility]. All Rights Reserved. [facility] [place] Patent #[id number].  Federal Law prohibits the replication, distribution or use without written permission from [facility] [employer]   Current Medications:       Current Outpatient Medications:     cyclobenzaprine (FLEXERIL) 10 mg tablet, TAKE 1 TABLET BY MOUTH THREE TIMES DAILY AS NEEDED, Disp: , Rfl:     pilocarpine (SALAGEN) 5 mg tablet, TAKE 1 TABLET BY MOUTH THREE TIMES DAILY, Disp: , Rfl:     predniSONE (DELTASONE) 10 mg tablet, TAKE 1 TABLET BY MOUTH ONCE DAILY, Disp: , Rfl:     terbinafine HCL (LAMISIL) 250 mg tablet, TAKE 1 TABLET BY MOUTH ONCE DAILY AT BEDTIME WITH A MEAL, Disp: , Rfl:     gabapentin (NEURONTIN) 300 mg capsule, , Disp: , Rfl:     estradioL (ESTRACE) 0.01 % (0.1 mg/gram) vaginal cream, Insert 1 g into vagina two (2) times a week., Disp: 42.5 g, Rfl: 12    ASPIRIN LOW DOSE PO, 81 mg every morning. Tablet, Oral  , Disp: , Rfl:     hydroxychloroquine (PLAQUENIL) 200 mg tablet, Take 200 mg by mouth every morning., Disp: , Rfl:     fluticasone (FLONASE) 50 mcg/actuation nasal spray, by Nasal route., Disp: , Rfl:     levothyroxine (SYNTHROID) 50 mcg tablet, Take 50 mcg by mouth Daily (before breakfast). , Disp: , Rfl:     buPROPion XL (WELLBUTRIN XL) 300 mg XL tablet, Take 300 mg by mouth every morning., Disp: , Rfl:     potassium chloride (KLOR-CON) 20 mEq packet, Take 20 mEq by mouth., Disp: , Rfl:     losartan (COZAAR) 100 mg tablet, Take 100 mg by mouth every morning., Disp: , Rfl:     amlodipine (NORVASC) 10 mg tablet, Take 10 mg by mouth every morning., Disp: , Rfl:    Date Last Reviewed:  1/5/2022   Number of Personal Factors/Comorbidities that affect the Plan of Care: 1-2: MODERATE COMPLEXITY   EXAMINATION:   Observation/Orthostatic Postural Assessment:          Pt with forward head rounded shoulder posture. Sitting in chair in full cervical flexion while looking at phone. Palpation:          n/t  ROM:          AROM R shoulder WNL                  L shoulder -  Flexion 90   Abduction 90  Able to reach behind head to lateral side of head   PROM- flex 120  Strength:          Pain with resisted ER  4-/5        Palpable popping with lowering of shoulder form flexion        Shoulder strength 4/5 throughout       Body Structures Involved:  1. Joints  2. Muscles Body Functions Affected:  1. Neuromusculoskeletal Activities and Participation Affected:  1.  Mobility   Number of elements (examined above) that affect the Plan of Care: 3: MODERATE COMPLEXITY   CLINICAL PRESENTATION:   Presentation: Evolving clinical presentation with changing clinical characteristics: MODERATE COMPLEXITY   CLINICAL DECISION MAKING:   Use of outcome tool(s) and clinical judgement create a POC that gives a: Questionable prediction of patient's progress: MODERATE COMPLEXITY

## 2022-01-06 NOTE — PROGRESS NOTES
Malu Bah  : 1965  Payor: [id number] [facility] / Plan: [address] / Product Type: PPO /  [address] at [facility] BRODIE GONZALEZ  [address] [place] [address], [address], [place] [address].  [phone]   [phone]       OUTPATIENT PHYSICAL THERAPY: Daily Treatment Note 2022  Visit Count: 1     ICD-10: Treatment Diagnosis: Bilateral shoulder pain, unspecified chronicity [M25.511, M25.512] Nontraumatic incomplete bilateral rotator cuff tear [M75.111, M75.112]  Precautions/Allergies:   Lisinopril   TREATMENT PLAN:  Effective Dates: 2022 TO 3/7/2022 (60 days). Frequency/Duration: 2 times a week for 60 Day(s) MEDICAL/REFERRING DIAGNOSIS:  Bilateral shoulder pain, unspecified chronicity [M25.511, M25.512]  Nontraumatic incomplete bilateral rotator cuff tear [M75.111, M75.112]  Adhesive capsulitis of both shoulders [M75.01, M75.02]   DATE OF ONSET: 4 yrs  REFERRING PHYSICIAN: Augustus Schlatter, PA MD Orders: eval and treat  Return MD Appointment: 4 weeks       Pre-treatment Symptoms/Complaints:  Pain in L  Shoulder less pain in R  Pain: Initial:   7/10 Post Session:  6/10   Medications Last Reviewed:  22  /    Updated Objective Findings:   See evaluation note from today     TREATMENT:     THERAPEUTIC EXERCISE: (15 minutes):  Exercises per grid below to improve mobility and strength. Required minimal visual and verbal cues to promote proper body alignment and promote proper body posture. Progressed range and repetitions as indicated. Date:  22 Date:   Date:     Activity/Exercise Parameters Parameters Parameters   pulleys 15x     scap retractions with band 2 x 8 green     Shoulder ext with band 2 x 10  green                             PROM in supine for all shoulder motions      HEP: as above  [employer] Portal    Treatment/Session Summary:    · Response to Treatment:  did well with exercises.   Liked the pulley for assist.  · Communication/Consultation:  None today  · Equipment provided today:  Pulleys , band  · Recommendations/Intent for next treatment session: Next visit will focus on ROM, strengthening, and functional strengthening    Total Treatment Billable Duration:  15 therapeutic ex  PT Patient Time In/Time Out  Time In: 1100  Time Out: [address] [facility],     Future Appointments   Date Time Provider Dariel Alegre   1/7/2022  9:00 AM Keagan Torres, PT [facility]   1/11/2022  9:00 AM Clifford Thomas, PT [facility]   1/13/2022  9:45 AM Keagan Torres, PT [facility]   1/17/2022 11:15 AM Meron Jose, PT [facility]   1/19/2022  9:00 AM Keagan Torres, PT [facility]   1/25/2022  9:00 AM Clifford Thomas, PT [facility]   1/27/2022  9:45 AM Keagan Torres, PT [facility]   1/31/2022 10:30 AM Keagan Torres, PT [facility]   2/3/2022  9:45 AM Meron Jose, PT [facility]   2/7/2022 10:30 AM Keagan Torres, PT [facility]   2/9/2022  9:00 AM Keagan Torres, PT [facility]   2/15/2022  8:45 AM Sanjuana Perdue PA [facility]

## 2022-01-07 ENCOUNTER — HOSPITAL ENCOUNTER (OUTPATIENT)
Dept: PHYSICAL THERAPY | Age: 57
Discharge: HOME | End: 2022-01-07
Attending: PHYSICIAN ASSISTANT
Payer: COMMERCIAL

## 2022-01-11 ENCOUNTER — HOSPITAL ENCOUNTER (OUTPATIENT)
Dept: PHYSICAL THERAPY | Age: 57
Discharge: HOME | End: 2022-01-11
Attending: PHYSICIAN ASSISTANT
Payer: COMMERCIAL

## 2022-01-11 PROCEDURE — 97140 MANUAL THERAPY 1/> REGIONS: CPT

## 2022-01-11 PROCEDURE — 97110 THERAPEUTIC EXERCISES: CPT

## 2022-01-11 NOTE — PROGRESS NOTES
Ashly Lion  : 1965  Payor: [facility] / Plan: [address] / Product Type: PPO /  [address] at [facility] BRODIE GONZALEZ  [address] [place] [address], [address], [place] [address].  [phone]   [phone]       OUTPATIENT PHYSICAL THERAPY: Daily Treatment Note 2022  Visit Count: 2     ICD-10: Treatment Diagnosis: Bilateral shoulder pain, unspecified chronicity [M25.511, M25.512] Nontraumatic incomplete bilateral rotator cuff tear [M75.111, M75.112]  Precautions/Allergies:   Lisinopril   TREATMENT PLAN:  Effective Dates: 2022 TO 3/7/2022 (60 days). Frequency/Duration: 2 times a week for 60 Day(s) MEDICAL/REFERRING DIAGNOSIS:  Pain in right shoulder [M25.511]  Pain in left shoulder [M25.512]  Incomplete rotator cuff tear or rupture of right shoulder, not specified as traumatic [M75.111]  Incomplete rotator cuff tear or rupture of left shoulder, not specified as traumatic [M75.112]  Adhesive capsulitis of right shoulder [M75.01]  Adhesive capsulitis of left shoulder [M75.02]   DATE OF ONSET: 4 yrs  REFERRING PHYSICIAN: LI Caceres MD Orders: eval and treat  Return MD Appointment: 4 weeks       Pre-treatment Symptoms/Complaints:  Pain in R, L shoulder stiff. Exercises going OK. States she will have to change some appointments because of conflicts with work that came up. Pain: Initial:   no VAS today Post Session:  No increase in pain reported   Medications Last Reviewed:  22  Updated Objective Findings:   Tender at R shoulder subacromial bursa. R shoulder ER = 4-/5 and weak with elevation. TREATMENT:   Manual (15 min) to improve motion   NELSY subscap release   L shoulder physio mobs for ER at 45 and 80 ABD, flexion with scapula stabilized  L GH inferior glide   R supraspinatus transverse friction massage  THERAPEUTIC EXERCISE: (25 minutes):  Exercises per grid below to improve mobility and strength.   Required minimal visual and verbal cues to promote proper body alignment and promote proper body posture. Date:  1/5/22 Date:  1/11/22 Date:     Activity/Exercise Parameters Parameters Parameters   pulleys 15x Supine shoulder flexion with hands clasped 10x    With focus on NELSY ROM and R ROM in tolerable range, elbows straight    10x    scap retractions with band 2 x 8 green 2x 10 green    Shoulder ext with band 2 x 10  green 2x 10 green    Shoulder ER  Yellow  2x10    eductated with Isometric ER                       Modalities : pt left with ice on R shoulder and told to keep it there for 10 min. HEP: pt to do initial HEP with additions from today. Pt to continue ice to R lateral shoulder for bursitis if ice helped today  [email or web address] Portal    Treatment/Session Summary:    · Response to Treatment: very tender to NELSY subscap. Responds well to posture correction. Good return demonstration of exercises.    · Communication/Consultation:  None today  · Equipment provided today:  additional band for when at work  · Recommendations/Intent for next treatment session: Next visit will focus on ROM, strengthening, and functional strengthening    Total Treatment Billable Duration:  40 min  PT Patient Time In/Time Out  Time In: 0905  Time Out: [address], PT    Future Appointments   Date Time Provider Dariel Alegre   1/13/2022  9:45 AM [facility], PT [facility]   1/17/2022 11:15 AM [facility], PT [facility]   1/19/2022  9:00 AM [facility], PT [facility]   1/21/2022  8:45 AM [address] THRU [facility] IMD IMD   1/25/2022  9:00 AM Reny Faye, PT [facility]   1/27/2022  9:45 AM [facility], PT [facility]   1/31/2022 10:30 AM [facility], PT [facility]   2/3/2022  9:45 AM [facility], PT [facility]   2/7/2022 10:30 AM [facility], PT [facility]   2/9/2022  [facility]   2/15/2022  8:45 AM Isabella Perdue PA [facility]

## 2022-01-13 ENCOUNTER — APPOINTMENT (OUTPATIENT)
Dept: PHYSICAL THERAPY | Age: 57
End: 2022-01-13
Attending: PHYSICIAN ASSISTANT
Payer: COMMERCIAL

## 2022-01-17 ENCOUNTER — APPOINTMENT (OUTPATIENT)
Dept: PHYSICAL THERAPY | Age: 57
End: 2022-01-17
Attending: PHYSICIAN ASSISTANT
Payer: COMMERCIAL

## 2022-02-07 ENCOUNTER — APPOINTMENT (OUTPATIENT)
Dept: PHYSICAL THERAPY | Age: 57
End: 2022-02-07
Attending: PHYSICIAN ASSISTANT

## 2022-02-09 ENCOUNTER — APPOINTMENT (OUTPATIENT)
Dept: PHYSICAL THERAPY | Age: 57
End: 2022-02-09
Attending: PHYSICIAN ASSISTANT

## 2022-03-04 ENCOUNTER — HOSPITAL ENCOUNTER (OUTPATIENT)
Dept: SLEEP MEDICINE | Age: 57
Discharge: HOME | End: 2022-03-04
Payer: COMMERCIAL

## 2022-03-04 PROCEDURE — 95810 POLYSOM 6/> YRS 4/> PARAM: CPT

## 2023-08-14 ENCOUNTER — OFFICE VISIT (OUTPATIENT)
Dept: SLEEP MEDICINE | Age: 58
End: 2023-08-14

## 2023-08-14 DIAGNOSIS — G47.33 OSA (OBSTRUCTIVE SLEEP APNEA): Primary | ICD-10-CM

## 2025-03-12 ENCOUNTER — APPOINTMENT (OUTPATIENT)
Dept: GENERAL RADIOLOGY | Age: 60
End: 2025-03-12
Payer: COMMERCIAL

## 2025-03-12 ENCOUNTER — HOSPITAL ENCOUNTER (EMERGENCY)
Age: 60
Discharge: HOME | End: 2025-03-12
Attending: EMERGENCY MEDICINE
Payer: COMMERCIAL

## 2025-03-12 VITALS
SYSTOLIC BLOOD PRESSURE: 128 MMHG | DIASTOLIC BLOOD PRESSURE: 85 MMHG | BODY MASS INDEX: 34.1 KG/M2 | HEIGHT: 68 IN | WEIGHT: 225 LBS | TEMPERATURE: 98 F | HEART RATE: 61 BPM | RESPIRATION RATE: 17 BRPM | OXYGEN SATURATION: 100 %

## 2025-03-12 DIAGNOSIS — M54.50 ACUTE LEFT-SIDED LOW BACK PAIN WITHOUT SCIATICA: Primary | ICD-10-CM

## 2025-03-12 PROCEDURE — 72220 X-RAY EXAM SACRUM TAILBONE: CPT

## 2025-03-12 PROCEDURE — 6370000000 HC RX 637 (ALT 250 FOR IP): Performed by: EMERGENCY MEDICINE

## 2025-03-12 PROCEDURE — 99283 EMERGENCY DEPT VISIT LOW MDM: CPT

## 2025-03-12 PROCEDURE — 72100 X-RAY EXAM L-S SPINE 2/3 VWS: CPT

## 2025-03-12 RX ORDER — IBUPROFEN 800 MG/1
800 TABLET, FILM COATED ORAL
Status: COMPLETED | OUTPATIENT
Start: 2025-03-12 | End: 2025-03-12

## 2025-03-12 RX ORDER — METHOCARBAMOL 750 MG/1
750 TABLET, FILM COATED ORAL
Status: COMPLETED | OUTPATIENT
Start: 2025-03-12 | End: 2025-03-12

## 2025-03-12 RX ORDER — IBUPROFEN 800 MG/1
800 TABLET, FILM COATED ORAL EVERY 8 HOURS PRN
Qty: 21 TABLET | Refills: 0 | Status: SHIPPED | OUTPATIENT
Start: 2025-03-12

## 2025-03-12 RX ORDER — TRAMADOL HYDROCHLORIDE 50 MG/1
50-100 TABLET ORAL EVERY 8 HOURS PRN
Qty: 12 TABLET | Refills: 0 | Status: SHIPPED | OUTPATIENT
Start: 2025-03-12 | End: 2025-03-15

## 2025-03-12 RX ORDER — METHOCARBAMOL 750 MG/1
750 TABLET, FILM COATED ORAL 3 TIMES DAILY PRN
Qty: 30 TABLET | Refills: 0 | Status: SHIPPED | OUTPATIENT
Start: 2025-03-12 | End: 2025-03-22

## 2025-03-12 RX ORDER — PREDNISONE 20 MG/1
40 TABLET ORAL DAILY
Qty: 6 TABLET | Refills: 0 | Status: SHIPPED | OUTPATIENT
Start: 2025-03-12 | End: 2025-03-15

## 2025-03-12 RX ORDER — HYDROCODONE BITARTRATE AND ACETAMINOPHEN 10; 325 MG/1; MG/1
1 TABLET ORAL
Refills: 0 | Status: COMPLETED | OUTPATIENT
Start: 2025-03-12 | End: 2025-03-12

## 2025-03-12 RX ADMIN — IBUPROFEN 800 MG: 800 TABLET, FILM COATED ORAL at 18:24

## 2025-03-12 RX ADMIN — PREDNISONE 60 MG: 50 TABLET ORAL at 18:24

## 2025-03-12 RX ADMIN — METHOCARBAMOL TABLETS 750 MG: 750 TABLET, COATED ORAL at 18:24

## 2025-03-12 RX ADMIN — HYDROCODONE BITARTRATE AND ACETAMINOPHEN 1 TABLET: 10; 325 TABLET ORAL at 18:24

## 2025-03-12 ASSESSMENT — PAIN - FUNCTIONAL ASSESSMENT
PAIN_FUNCTIONAL_ASSESSMENT: 0-10
PAIN_FUNCTIONAL_ASSESSMENT: 0-10

## 2025-03-12 ASSESSMENT — PAIN SCALES - GENERAL
PAINLEVEL_OUTOF10: 4
PAINLEVEL_OUTOF10: 6
PAINLEVEL_OUTOF10: 10

## 2025-03-12 ASSESSMENT — PAIN DESCRIPTION - DESCRIPTORS: DESCRIPTORS: ACHING

## 2025-03-12 ASSESSMENT — LIFESTYLE VARIABLES
HOW MANY STANDARD DRINKS CONTAINING ALCOHOL DO YOU HAVE ON A TYPICAL DAY: PATIENT DOES NOT DRINK
HOW OFTEN DO YOU HAVE A DRINK CONTAINING ALCOHOL: NEVER

## 2025-03-12 ASSESSMENT — PAIN DESCRIPTION - ORIENTATION: ORIENTATION: LEFT

## 2025-03-12 ASSESSMENT — PAIN DESCRIPTION - LOCATION: LOCATION: HIP

## 2025-03-12 NOTE — ED TRIAGE NOTES
Patient states she does have some back problems but today is the first time she had her left hip lock up on her and feel like a taring pain when walking, sitting, or trying to move.

## 2025-03-12 NOTE — ED PROVIDER NOTES
Emergency Department Provider Note     PCP: Lopez Yin MD   Age: 59 y.o.   Sex: female     DISPOSITION    No diagnosis found.         ED Room: D02/D02    History     HISTORY OF PRESENT ILLNESS    A patient presented to the Emergency Department with left hip and buttock pain. The history was provided by the patient. The pain began this morning and is characterized by an inability to bear weight on the left foot, as well as pain when sitting, lying down, turning over, and standing. The patient reports no history of similar pain, no fall trauma or overuse syndrome, and no radicular symptoms or pain radiating down the left leg. Additionally, there are no abnormal pain or urinary difficulties, and no abdominal pain. The patient is unable to find a comfortable position whether sitting, lying, or walking.      Physical Exam     Physical Exam  Vitals and nursing note reviewed.   Constitutional:       General: She is not in acute distress.     Appearance: Normal appearance. She is not ill-appearing, toxic-appearing or diaphoretic.   HENT:      Head: Normocephalic and atraumatic.      Right Ear: External ear normal.      Left Ear: External ear normal.      Nose: Nose normal. No rhinorrhea.   Eyes:      General: No scleral icterus.        Right eye: No discharge.         Left eye: No discharge.      Extraocular Movements: Extraocular movements intact.      Conjunctiva/sclera: Conjunctivae normal.   Pulmonary:      Effort: Pulmonary effort is normal. No respiratory distress.   Abdominal:      Palpations: Abdomen is soft.      Tenderness: There is no abdominal tenderness. There is no guarding or rebound.   Musculoskeletal:         General: No deformity or signs of injury.      Cervical back: Normal range of motion and neck supple. No rigidity.      Lumbar back: Tenderness present. Decreased range of motion. Negative right straight leg raise test and negative left straight leg raise test.        Back:       Comments: Pain  TABLET    Take 200 mg by mouth    LEVOTHYROXINE (SYNTHROID) 50 MCG TABLET    Take 50 mcg by mouth every morning (before breakfast)    LOSARTAN (COZAAR) 100 MG TABLET    Take 100 mg by mouth    PILOCARPINE (SALAGEN) 5 MG TABLET    TAKE 1 TABLET BY MOUTH THREE TIMES DAILY    POTASSIUM CHLORIDE (KLOR-CON) 20 MEQ PACKET    Take 20 mEq by mouth    PREDNISONE (DELTASONE) 10 MG TABLET    TAKE 1 TABLET BY MOUTH ONCE DAILY    TERBINAFINE (LAMISIL) 250 MG TABLET    TAKE 1 TABLET BY MOUTH ONCE DAILY AT BEDTIME WITH A MEAL        Results from this emergency department visit:      No results found for any visits on 03/12/25.      XR LUMBAR SPINE (2-3 VIEWS)    (Results Pending)   XR SACRUM COCCYX (MIN 2 VIEWS)    (Results Pending)                No results for input(s): \"COVID19\" in the last 72 hours.      any visits on 03/12/25.      XR LUMBAR SPINE (2-3 VIEWS)    (Results Pending)   XR SACRUM COCCYX (MIN 2 VIEWS)    (Results Pending)                No results for input(s): \"COVID19\" in the last 72 hours.        Reyes Cabrera MD  03/22/25 1295

## 2025-03-13 NOTE — DISCHARGE INSTRUCTIONS
Switch from heat to ice,   medicines as directed,   switch muscle relaxer from Flexeril to Robaxin  Follow-up with your family doctor return to the ER if worse in any way

## 2025-03-13 NOTE — ED NOTES
I have reviewed discharge instructions with the patient.  The patient verbalized understanding.    Patient left ED via Discharge Method: ambulatory to Home with male visitor.    Opportunity for questions and clarification provided.       Patient given 4 scripts. E-scribed to [facility].             Selig, Nissa, RN  03/12/25 2019

## 2025-03-17 ENCOUNTER — APPOINTMENT (OUTPATIENT)
Dept: CT IMAGING | Age: 60
End: 2025-03-17
Payer: COMMERCIAL

## 2025-03-17 ENCOUNTER — HOSPITAL ENCOUNTER (EMERGENCY)
Age: 60
Discharge: HOME | End: 2025-03-17
Payer: COMMERCIAL

## 2025-03-17 VITALS
HEIGHT: 68 IN | TEMPERATURE: 98.2 F | HEART RATE: 60 BPM | RESPIRATION RATE: 18 BRPM | SYSTOLIC BLOOD PRESSURE: 150 MMHG | DIASTOLIC BLOOD PRESSURE: 89 MMHG | BODY MASS INDEX: 36.37 KG/M2 | OXYGEN SATURATION: 97 % | WEIGHT: 240 LBS

## 2025-03-17 DIAGNOSIS — R59.0 PAROTID LYMPHADENOPATHY: Primary | ICD-10-CM

## 2025-03-17 DIAGNOSIS — E04.1 THYROID NODULE: ICD-10-CM

## 2025-03-17 DIAGNOSIS — K02.9 DENTAL CARIES: ICD-10-CM

## 2025-03-17 LAB
ALBUMIN SERPL-MCNC: 3.6 G/DL (ref 3.5–5)
ALBUMIN/GLOB SERPL: 0.8 (ref 1–1.9)
ALP SERPL-CCNC: 94 U/L (ref 35–104)
ALT SERPL-CCNC: 21 U/L (ref 8–45)
ANION GAP SERPL CALC-SCNC: 14 MMOL/L (ref 7–16)
AST SERPL-CCNC: 20 U/L (ref 15–37)
BASOPHILS # BLD: 0.03 K/UL (ref 0–0.2)
BASOPHILS NFR BLD: 0.3 % (ref 0–2)
BILIRUB SERPL-MCNC: 0.5 MG/DL (ref 0–1.2)
BUN SERPL-MCNC: 23 MG/DL (ref 6–23)
CALCIUM SERPL-MCNC: 9.6 MG/DL (ref 8.8–10.2)
CHLORIDE SERPL-SCNC: 103 MMOL/L (ref 98–107)
CO2 SERPL-SCNC: 25 MMOL/L (ref 20–29)
CREAT SERPL-MCNC: 1.15 MG/DL (ref 0.6–1.1)
DIFFERENTIAL METHOD BLD: ABNORMAL
EOSINOPHIL # BLD: 0.05 K/UL (ref 0–0.8)
EOSINOPHIL NFR BLD: 0.5 % (ref 0.5–7.8)
ERYTHROCYTE [DISTWIDTH] IN BLOOD BY AUTOMATED COUNT: 13.7 % (ref 11.9–14.6)
GLOBULIN SER CALC-MCNC: 4.5 G/DL (ref 2.3–3.5)
GLUCOSE SERPL-MCNC: 101 MG/DL (ref 70–99)
HCT VFR BLD AUTO: 42.9 % (ref 35.8–46.3)
HGB BLD-MCNC: 14 G/DL (ref 11.7–15.4)
IMM GRANULOCYTES # BLD AUTO: 0.05 K/UL (ref 0–0.5)
IMM GRANULOCYTES NFR BLD AUTO: 0.5 % (ref 0–5)
LYMPHOCYTES # BLD: 3.18 K/UL (ref 0.5–4.6)
LYMPHOCYTES NFR BLD: 30.5 % (ref 13–44)
MCH RBC QN AUTO: 26.2 PG (ref 26.1–32.9)
MCHC RBC AUTO-ENTMCNC: 32.6 G/DL (ref 31.4–35)
MCV RBC AUTO: 80.3 FL (ref 82–102)
MONOCYTES # BLD: 0.66 K/UL (ref 0.1–1.3)
MONOCYTES NFR BLD: 6.3 % (ref 4–12)
NEUTS SEG # BLD: 6.45 K/UL (ref 1.7–8.2)
NEUTS SEG NFR BLD: 61.9 % (ref 43–78)
NRBC # BLD: 0 K/UL (ref 0–0.2)
PLATELET # BLD AUTO: 280 K/UL (ref 150–450)
PMV BLD AUTO: 10.3 FL (ref 9.4–12.3)
POTASSIUM SERPL-SCNC: 3.1 MMOL/L (ref 3.5–5.1)
PROT SERPL-MCNC: 8.1 G/DL (ref 6.3–8.2)
RBC # BLD AUTO: 5.34 M/UL (ref 4.05–5.2)
SODIUM SERPL-SCNC: 141 MMOL/L (ref 136–145)
WBC # BLD AUTO: 10.4 K/UL (ref 4.3–11.1)

## 2025-03-17 PROCEDURE — 6360000004 HC RX CONTRAST MEDICATION: Performed by: PHYSICIAN ASSISTANT

## 2025-03-17 PROCEDURE — 85025 COMPLETE CBC W/AUTO DIFF WBC: CPT

## 2025-03-17 PROCEDURE — 6370000000 HC RX 637 (ALT 250 FOR IP): Performed by: PHYSICIAN ASSISTANT

## 2025-03-17 PROCEDURE — 96374 THER/PROPH/DIAG INJ IV PUSH: CPT

## 2025-03-17 PROCEDURE — 70491 CT SOFT TISSUE NECK W/DYE: CPT

## 2025-03-17 PROCEDURE — 99285 EMERGENCY DEPT VISIT HI MDM: CPT

## 2025-03-17 PROCEDURE — 6360000002 HC RX W HCPCS: Performed by: PHYSICIAN ASSISTANT

## 2025-03-17 PROCEDURE — 80053 COMPREHEN METABOLIC PANEL: CPT

## 2025-03-17 RX ORDER — POTASSIUM CHLORIDE 1500 MG/1
40 TABLET, EXTENDED RELEASE ORAL ONCE
Status: COMPLETED | OUTPATIENT
Start: 2025-03-17 | End: 2025-03-17

## 2025-03-17 RX ORDER — HYDROCODONE BITARTRATE AND ACETAMINOPHEN 5; 325 MG/1; MG/1
1 TABLET ORAL
Refills: 0 | Status: COMPLETED | OUTPATIENT
Start: 2025-03-17 | End: 2025-03-17

## 2025-03-17 RX ORDER — ONDANSETRON 4 MG/1
4 TABLET, ORALLY DISINTEGRATING ORAL 3 TIMES DAILY PRN
Qty: 21 TABLET | Refills: 0 | Status: SHIPPED | OUTPATIENT
Start: 2025-03-17

## 2025-03-17 RX ORDER — KETOROLAC TROMETHAMINE 15 MG/ML
15 INJECTION, SOLUTION INTRAMUSCULAR; INTRAVENOUS ONCE
Status: COMPLETED | OUTPATIENT
Start: 2025-03-17 | End: 2025-03-17

## 2025-03-17 RX ORDER — IOPAMIDOL 755 MG/ML
75 INJECTION, SOLUTION INTRAVASCULAR
Status: COMPLETED | OUTPATIENT
Start: 2025-03-17 | End: 2025-03-17

## 2025-03-17 RX ORDER — HYDROCODONE BITARTRATE AND ACETAMINOPHEN 5; 325 MG/1; MG/1
1 TABLET ORAL EVERY 6 HOURS PRN
Qty: 10 TABLET | Refills: 0 | Status: SHIPPED | OUTPATIENT
Start: 2025-03-17 | End: 2025-03-20

## 2025-03-17 RX ADMIN — HYDROCODONE BITARTRATE AND ACETAMINOPHEN 1 TABLET: 5; 325 TABLET ORAL at 20:49

## 2025-03-17 RX ADMIN — IOPAMIDOL 75 ML: 755 INJECTION, SOLUTION INTRAVENOUS at 17:22

## 2025-03-17 RX ADMIN — KETOROLAC TROMETHAMINE 15 MG: 15 INJECTION, SOLUTION INTRAMUSCULAR; INTRAVENOUS at 18:39

## 2025-03-17 RX ADMIN — POTASSIUM CHLORIDE 40 MEQ: 1500 TABLET, EXTENDED RELEASE ORAL at 18:37

## 2025-03-17 RX ADMIN — AMOXICILLIN AND CLAVULANATE POTASSIUM 1 TABLET: 875; 125 TABLET, FILM COATED ORAL at 20:49

## 2025-03-17 ASSESSMENT — PAIN DESCRIPTION - ORIENTATION: ORIENTATION: LEFT

## 2025-03-17 ASSESSMENT — PAIN - FUNCTIONAL ASSESSMENT: PAIN_FUNCTIONAL_ASSESSMENT: 0-10

## 2025-03-17 ASSESSMENT — PAIN SCALES - GENERAL
PAINLEVEL_OUTOF10: 7
PAINLEVEL_OUTOF10: 8
PAINLEVEL_OUTOF10: 8

## 2025-03-17 ASSESSMENT — PAIN DESCRIPTION - PAIN TYPE: TYPE: ACUTE PAIN

## 2025-03-17 ASSESSMENT — PAIN DESCRIPTION - LOCATION
LOCATION: NECK

## 2025-03-17 ASSESSMENT — ENCOUNTER SYMPTOMS
GASTROINTESTINAL NEGATIVE: 1
RESPIRATORY NEGATIVE: 1

## 2025-03-17 NOTE — ED TRIAGE NOTES
Pt ambulatory to ED w/ cc of L sided neck pain and swelling that started yesterday. Pt states she has not been able to eat or drink since yesterday due to pain. Pt denies any fever, NVD, abd pain. Pt A&O x 4

## 2025-03-17 NOTE — ED PROVIDER NOTES
following:  Automated exposure control, adjustment of the mA and/or kVp according to  patient's size, iterative reconstruction.    COMPARISON: None    FINDINGS:  - CERVICAL NODES: No significant adenopathy.   - SALIVARY GLANDS: Prominent soft tissue attenuation nodules in the bilateral  parotid glands.  - TONSILS/PHARYNX: Normal.  - THYROID: 1.8 cm heterogeneous nodule in the right lobe of the thyroid.    - SINUSES: Clear.  - BONES: No bone lesions.  - LUNG APICES: Clear.  - OTHER: No additional findings. The  fat planes are preserved. Dental  caries and periapical lucency at the left inferior first premolar tooth. The  cervical vasculature is patent. No distinct cervical collection to suggest  abscess. The epiglottis is unremarkable.      Impression    1.  No distinct cervical collection to suggest abscess.  2.  Prominent soft tissue attenuation nodules at the bilateral parotid glands,  which could reflect benign lymphoepithelial lesions or could reflect abnormal  lymph nodes. Recommend further assessment with ultrasound on a nonemergent  basis.  3.  1.8 cm heterogeneous nodule in the right lobe of the thyroid. Recommend  further assessment with ultrasound.  4.  Dental caries and periapical lucency at the left inferior first premolar  tooth. The differential diagnostic considerations are radicular cyst or  periapical abscess        Electronically signed by BILLIE DE LA TORRE   CBC with Auto Differential   Result Value Ref Range    WBC 10.4 4.3 - 11.1 K/uL    RBC 5.34 (H) 4.05 - 5.2 M/uL    Hemoglobin 14.0 11.7 - 15.4 g/dL    Hematocrit 42.9 35.8 - 46.3 %    MCV 80.3 (L) 82 - 102 FL    MCH 26.2 26.1 - 32.9 PG    MCHC 32.6 31.4 - 35.0 g/dL    RDW 13.7 11.9 - 14.6 %    Platelets 280 150 - 450 K/uL    MPV 10.3 9.4 - 12.3 FL    nRBC 0.00 0.0 - 0.2 K/uL    Differential Type AUTOMATED      Neutrophils % 61.9 43.0 - 78.0 %    Lymphocytes % 30.5 13.0 - 44.0 %    Monocytes % 6.3 4.0 - 12.0 %    Eosinophils % 0.5 0.5 -

## 2025-04-09 ENCOUNTER — OFFICE VISIT (OUTPATIENT)
Dept: ENT CLINIC | Age: 60
End: 2025-04-09
Payer: COMMERCIAL

## 2025-04-09 VITALS — BODY MASS INDEX: 34.86 KG/M2 | WEIGHT: 230 LBS | RESPIRATION RATE: 16 BRPM | HEIGHT: 68 IN

## 2025-04-09 DIAGNOSIS — E04.1 THYROID NODULE: ICD-10-CM

## 2025-04-09 DIAGNOSIS — K11.8 PAROTID NODULE: ICD-10-CM

## 2025-04-09 DIAGNOSIS — R13.14 PHARYNGOESOPHAGEAL DYSPHAGIA: Primary | ICD-10-CM

## 2025-04-09 DIAGNOSIS — Q18.1 PREAURICULAR CYST: ICD-10-CM

## 2025-04-09 PROCEDURE — 31575 DIAGNOSTIC LARYNGOSCOPY: CPT | Performed by: STUDENT IN AN ORGANIZED HEALTH CARE EDUCATION/TRAINING PROGRAM

## 2025-04-09 PROCEDURE — 99204 OFFICE O/P NEW MOD 45 MIN: CPT | Performed by: STUDENT IN AN ORGANIZED HEALTH CARE EDUCATION/TRAINING PROGRAM

## 2025-04-09 RX ORDER — METHOCARBAMOL 500 MG/1
TABLET, FILM COATED ORAL
COMMUNITY
Start: 2024-06-16

## 2025-04-09 RX ORDER — PEG-3350, SODIUM SULFATE, SODIUM CHLORIDE, POTASSIUM CHLORIDE, SODIUM ASCORBATE AND ASCORBIC ACID 7.5-2.691G
KIT ORAL
COMMUNITY
Start: 2024-09-11

## 2025-04-09 RX ORDER — TRAMADOL HYDROCHLORIDE 50 MG/1
TABLET ORAL
COMMUNITY

## 2025-04-09 RX ORDER — OLMESARTAN MEDOXOMIL AND HYDROCHLOROTHIAZIDE 40/12.5 40; 12.5 MG/1; MG/1
1 TABLET ORAL DAILY
COMMUNITY
Start: 2024-06-18

## 2025-04-09 RX ORDER — GLIPIZIDE 10 MG/1
10 TABLET, FILM COATED, EXTENDED RELEASE ORAL DAILY
COMMUNITY
Start: 2024-06-18

## 2025-04-09 RX ORDER — AMPICILLIN TRIHYDRATE 250 MG
500 CAPSULE ORAL DAILY
COMMUNITY

## 2025-04-09 RX ORDER — GABAPENTIN 400 MG/1
CAPSULE ORAL
COMMUNITY
Start: 2024-06-18

## 2025-04-09 RX ORDER — POTASSIUM CHLORIDE 750 MG/1
10 TABLET, EXTENDED RELEASE ORAL DAILY
COMMUNITY
Start: 2025-04-03

## 2025-04-09 RX ORDER — ATORVASTATIN CALCIUM 40 MG/1
1 TABLET, FILM COATED ORAL DAILY
COMMUNITY

## 2025-04-09 RX ORDER — ZOLPIDEM TARTRATE 5 MG/1
5 TABLET ORAL DAILY
COMMUNITY
Start: 2024-06-18

## 2025-04-09 RX ORDER — VARENICLINE TARTRATE 0.5 (11)-1
KIT ORAL
COMMUNITY
Start: 2025-01-08

## 2025-04-09 RX ORDER — CYCLOBENZAPRINE HCL 10 MG
10 TABLET ORAL NIGHTLY PRN
Qty: 10 TABLET | Refills: 0 | Status: SHIPPED | OUTPATIENT
Start: 2025-04-09 | End: 2025-04-19

## 2025-04-09 RX ORDER — ALBUTEROL SULFATE 0.83 MG/ML
SOLUTION RESPIRATORY (INHALATION)
COMMUNITY
Start: 2025-01-15

## 2025-04-09 RX ORDER — MELOXICAM 15 MG/1
TABLET ORAL
COMMUNITY

## 2025-04-09 RX ORDER — PRAVASTATIN SODIUM 40 MG
40 TABLET ORAL DAILY
COMMUNITY
Start: 2024-06-18

## 2025-04-09 RX ORDER — VIT B COMP NO.3/FOLIC/C/BIOTIN 1 MG-60 MG
1 TABLET ORAL DAILY
COMMUNITY

## 2025-04-09 RX ORDER — FEXOFENADINE HCL 180 MG/1
180 TABLET ORAL DAILY
COMMUNITY

## 2025-04-09 RX ORDER — DOXYCYCLINE 100 MG/1
CAPSULE ORAL
COMMUNITY
Start: 2025-01-06

## 2025-04-09 RX ORDER — METOPROLOL SUCCINATE 50 MG/1
1 TABLET, EXTENDED RELEASE ORAL DAILY
COMMUNITY
Start: 2025-01-15

## 2025-04-09 RX ORDER — KETOROLAC TROMETHAMINE 10 MG/1
10 TABLET, FILM COATED ORAL EVERY 6 HOURS PRN
COMMUNITY
Start: 2024-06-18

## 2025-04-09 ASSESSMENT — ENCOUNTER SYMPTOMS
SHORTNESS OF BREATH: 0
APNEA: 0
FACIAL SWELLING: 0
SINUS PRESSURE: 0
STRIDOR: 0
EYE PAIN: 0
CONSTIPATION: 0
DIARRHEA: 0
CHOKING: 0
TROUBLE SWALLOWING: 1
WHEEZING: 0
SINUS PAIN: 0
NAUSEA: 0
COUGH: 0
EYE DISCHARGE: 0
EYE ITCHING: 0

## 2025-04-09 NOTE — PROGRESS NOTES
caries and periapical lucency at the left inferior first premolar  tooth. The differential diagnostic considerations are radicular cyst or  periapical abscess        Electronically signed by BILLIE DE LA TORRE    Lab Results   Component Value Date    WBC 10.4 03/17/2025    HGB 14.0 03/17/2025    HCT 42.9 03/17/2025    MCV 80.3 (L) 03/17/2025     03/17/2025     Lab Results   Component Value Date/Time     03/17/2025 04:23 PM    K 3.1 03/17/2025 04:23 PM     03/17/2025 04:23 PM    CO2 25 03/17/2025 04:23 PM    BUN 23 03/17/2025 04:23 PM    CREATININE 1.15 03/17/2025 04:23 PM    GLUCOSE 101 03/17/2025 04:23 PM    CALCIUM 9.6 03/17/2025 04:23 PM    LABGLOM 56 03/17/2025 04:23 PM        ASSESSMENT and PLAN  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  Will get ultrasound for thyroid nodule.  Will plan for excision of left preauricular cyst in the office.  Left neck pain appears to be musculoskeletal pain of the SCM.  Will refill her Flexeril.    ICD-10-CM    1. Pharyngoesophageal dysphagia  R13.14 LARYNGOSCOPY,FLEX FIBER,DIAGNOSTIC      2. Thyroid nodule  E04.1 US THYROID      3. Parotid nodule  K11.8       4. Preauricular cyst  Q18.1               Keshav Ordonez MD  4/9/2025  Electronically signed    Note dictated using voice recognition software.  Please excuse any typos.

## 2025-04-14 ENCOUNTER — APPOINTMENT (OUTPATIENT)
Dept: GENERAL RADIOLOGY | Age: 60
End: 2025-04-14
Payer: COMMERCIAL

## 2025-04-14 ENCOUNTER — TELEPHONE (OUTPATIENT)
Dept: ENT CLINIC | Age: 60
End: 2025-04-14

## 2025-04-14 ENCOUNTER — HOSPITAL ENCOUNTER (EMERGENCY)
Age: 60
Discharge: HOME | End: 2025-04-14
Payer: COMMERCIAL

## 2025-04-14 VITALS
HEART RATE: 71 BPM | OXYGEN SATURATION: 100 % | TEMPERATURE: 98 F | DIASTOLIC BLOOD PRESSURE: 92 MMHG | SYSTOLIC BLOOD PRESSURE: 146 MMHG | RESPIRATION RATE: 15 BRPM | WEIGHT: 237 LBS | HEIGHT: 67 IN | BODY MASS INDEX: 37.2 KG/M2

## 2025-04-14 DIAGNOSIS — S16.1XXA STRAIN OF NECK MUSCLE, INITIAL ENCOUNTER: Primary | ICD-10-CM

## 2025-04-14 PROCEDURE — 96372 THER/PROPH/DIAG INJ SC/IM: CPT

## 2025-04-14 PROCEDURE — 6360000002 HC RX W HCPCS: Performed by: PHYSICIAN ASSISTANT

## 2025-04-14 PROCEDURE — 72040 X-RAY EXAM NECK SPINE 2-3 VW: CPT

## 2025-04-14 PROCEDURE — 6370000000 HC RX 637 (ALT 250 FOR IP): Performed by: PHYSICIAN ASSISTANT

## 2025-04-14 PROCEDURE — 99284 EMERGENCY DEPT VISIT MOD MDM: CPT

## 2025-04-14 RX ORDER — HYDROCODONE BITARTRATE AND ACETAMINOPHEN 5; 325 MG/1; MG/1
1 TABLET ORAL EVERY 6 HOURS PRN
Qty: 10 TABLET | Refills: 0 | Status: SHIPPED | OUTPATIENT
Start: 2025-04-14 | End: 2025-04-17

## 2025-04-14 RX ORDER — LIDOCAINE 50 MG/G
1 PATCH TOPICAL DAILY
Qty: 10 PATCH | Refills: 0 | Status: SHIPPED | OUTPATIENT
Start: 2025-04-14 | End: 2025-04-24

## 2025-04-14 RX ORDER — HYDROCODONE BITARTRATE AND ACETAMINOPHEN 5; 325 MG/1; MG/1
1 TABLET ORAL
Refills: 0 | Status: COMPLETED | OUTPATIENT
Start: 2025-04-14 | End: 2025-04-14

## 2025-04-14 RX ORDER — KETOROLAC TROMETHAMINE 30 MG/ML
30 INJECTION, SOLUTION INTRAMUSCULAR; INTRAVENOUS ONCE
Status: COMPLETED | OUTPATIENT
Start: 2025-04-14 | End: 2025-04-14

## 2025-04-14 RX ORDER — METHOCARBAMOL 500 MG/1
1000 TABLET, FILM COATED ORAL ONCE
Status: COMPLETED | OUTPATIENT
Start: 2025-04-14 | End: 2025-04-14

## 2025-04-14 RX ADMIN — METHOCARBAMOL TABLETS 1000 MG: 500 TABLET, COATED ORAL at 17:54

## 2025-04-14 RX ADMIN — KETOROLAC TROMETHAMINE 30 MG: 30 INJECTION, SOLUTION INTRAMUSCULAR at 17:55

## 2025-04-14 RX ADMIN — HYDROCODONE BITARTRATE AND ACETAMINOPHEN 1 TABLET: 5; 325 TABLET ORAL at 17:54

## 2025-04-14 ASSESSMENT — PAIN - FUNCTIONAL ASSESSMENT: PAIN_FUNCTIONAL_ASSESSMENT: 0-10

## 2025-04-14 ASSESSMENT — PAIN DESCRIPTION - LOCATION: LOCATION: NECK

## 2025-04-14 ASSESSMENT — ENCOUNTER SYMPTOMS
RESPIRATORY NEGATIVE: 1
GASTROINTESTINAL NEGATIVE: 1

## 2025-04-14 ASSESSMENT — PAIN SCALES - GENERAL
PAINLEVEL_OUTOF10: 8
PAINLEVEL_OUTOF10: 7
PAINLEVEL_OUTOF10: 5

## 2025-04-14 ASSESSMENT — PAIN DESCRIPTION - DESCRIPTORS: DESCRIPTORS: THROBBING;SHARP

## 2025-04-14 NOTE — ED PROVIDER NOTES
performed blood work and CT imaging which showed parotid and thyroid nodules.  Patient has followed up with the ENT and they deem her pain to be musculoskeletal.        ROS     Review of Systems   Constitutional: Negative.    HENT: Negative.     Respiratory: Negative.     Cardiovascular: Negative.    Gastrointestinal: Negative.    Genitourinary: Negative.    Musculoskeletal:  Positive for neck pain.   All other systems reviewed and are negative.       Physical Exam     Vitals signs and nursing note reviewed:  Vitals:    04/14/25 1702 04/14/25 1830   BP: (!) 135/90 (!) 146/92   Pulse: 72 71   Resp: 19 15   Temp: 98 °F (36.7 °C)    TempSrc: Oral    SpO2: 98% 100%   Weight: 107.5 kg (237 lb)    Height: 1.702 m (5' 7\")       Physical Exam  Vitals and nursing note reviewed.   Constitutional:       General: She is not in acute distress.     Appearance: She is well-developed. She is obese. She is not ill-appearing or toxic-appearing.   HENT:      Head: Normocephalic.   Eyes:      Extraocular Movements: Extraocular movements intact.   Neck:      Comments: Tenderness over the right sternocleidomastoid.  When she rotates her neck to the right pain is reproduced.  No swelling erythema rashes or lesions.  Cardiovascular:      Rate and Rhythm: Normal rate and regular rhythm.      Pulses: Normal pulses.      Heart sounds: Normal heart sounds.   Pulmonary:      Effort: Pulmonary effort is normal.      Breath sounds: Normal breath sounds.   Musculoskeletal:         General: Normal range of motion.      Cervical back: Normal range of motion and neck supple. Tenderness present.   Lymphadenopathy:      Cervical: No cervical adenopathy.   Skin:     General: Skin is warm and dry.      Findings: No rash.   Neurological:      General: No focal deficit present.      Mental Status: She is alert. Mental status is at baseline.   Psychiatric:         Mood and Affect: Mood normal.         Behavior: Behavior normal.         Thought Content:  <<-----Click on this checkbox to enter Pre-Op Dx

## 2025-04-14 NOTE — ED TRIAGE NOTES
Patient states pain in left neck that swapped to right side. Denies injury, fever, or chills. Patient denies CP/SHOB.

## 2025-04-14 NOTE — TELEPHONE ENCOUNTER
Called pt to schedule. Vmail is full.     If pt calls back, We are currenly out of office procedure slots, so it will need to take up to normal slots. That can be either a NP or POV or OV. As long as the spots are next to each other. EX 3:15pm and 3:30pm. Whatever next available is, is fine.

## 2025-04-14 NOTE — DISCHARGE INSTRUCTIONS
Call ENT for follow up as scheduled. Take muscle relaxant as prescribed. Use heating pad. Return if worsening. Call spine specialist if symptoms persist.

## 2025-04-14 NOTE — TELEPHONE ENCOUNTER
----- Message from Sharon JIANG sent at 4/9/2025  4:08 PM EDT -----  Regarding: Office Procedure  Dr. Ordonez would like pt back for office procedure, nothing available before he goes out on leave.

## 2025-04-22 ENCOUNTER — OFFICE VISIT (OUTPATIENT)
Age: 60
End: 2025-04-22
Payer: COMMERCIAL

## 2025-04-22 VITALS — WEIGHT: 237 LBS | BODY MASS INDEX: 37.2 KG/M2 | HEIGHT: 67 IN

## 2025-04-22 DIAGNOSIS — M54.2 NECK PAIN: Primary | ICD-10-CM

## 2025-04-22 PROCEDURE — 99203 OFFICE O/P NEW LOW 30 MIN: CPT | Performed by: ORTHOPAEDIC SURGERY

## 2025-04-22 RX ORDER — TIZANIDINE 2 MG/1
2 TABLET ORAL 3 TIMES DAILY PRN
Qty: 30 TABLET | Refills: 0 | Status: SHIPPED | OUTPATIENT
Start: 2025-04-22

## 2025-04-22 NOTE — PROGRESS NOTES
MG tablet Take 1 tablet by mouth  Automatic Reconciliation, Ar   levothyroxine (SYNTHROID) 50 MCG tablet Take 1 tablet by mouth every morning (before breakfast)  Automatic Reconciliation, Ar   losartan (COZAAR) 100 MG tablet Take 1 tablet by mouth  Automatic Reconciliation, Ar   pilocarpine (SALAGEN) 5 MG tablet TAKE 1 TABLET BY MOUTH THREE TIMES DAILY  Automatic Reconciliation, Ar   potassium chloride (KLOR-CON) 20 MEQ packet Take 20 mEq by mouth  Automatic Reconciliation, Ar   predniSONE (DELTASONE) 10 MG tablet TAKE 1 TABLET BY MOUTH ONCE DAILY  Automatic Reconciliation, Ar   terbinafine (LAMISIL) 250 MG tablet TAKE 1 TABLET BY MOUTH ONCE DAILY AT BEDTIME WITH A MEAL  Automatic Reconciliation, Ar       Allergies:     Allergies   Allergen Reactions    Lisinopril Other (See Comments)     Swell up        Physical Exam:     This is a well developed well nourished adult female in no acute distress.     Oriented to person, place and time.    Mood and affect are appropriate.    Respirations are unlabored and there is no evidence of cyanosis.    Pulses in the upper extremities are palpable and equal      Sensory testing:       C5   C6   C7   C8    T1   Right 2 2 2 2 2   Left 2 2 2 2 2     0=absent; 1=altered; 2=normal; NT= not tested  Reflexes     Right Left   Biceps (C5) 2 2   Brachio radialis (C6) 2 2    Triceps (C7) 2 2       Strength testing in the upper extremity reveals the following based on the 5 point grading scale:     Biceps  (C5) WE  (C6) Tricep (C7) FDP   (C8) Intrinsics   (T1)   Right 5 5 5 5 5   Left 5 5 5 5 5   0=total paralysis; 1=palpable or visible contraction; 2= active movement with gravity eliminated; 3= active movement against gravity; 4= active movement against moderate resistance; 5= active movement against full resistance     Weir's is negative     Inverted radial reflex is negative        Radiographic Studies:    Radiographs:    AP and lateral views of the cervical spine, reveal appropriate

## 2025-04-28 ENCOUNTER — APPOINTMENT (OUTPATIENT)
Dept: GENERAL RADIOLOGY | Age: 60
End: 2025-04-28
Payer: COMMERCIAL

## 2025-04-28 ENCOUNTER — HOSPITAL ENCOUNTER (EMERGENCY)
Age: 60
Discharge: HOME | End: 2025-04-28
Payer: COMMERCIAL

## 2025-04-28 VITALS
HEIGHT: 67 IN | TEMPERATURE: 98.3 F | HEART RATE: 63 BPM | RESPIRATION RATE: 23 BRPM | DIASTOLIC BLOOD PRESSURE: 74 MMHG | SYSTOLIC BLOOD PRESSURE: 123 MMHG | BODY MASS INDEX: 36.1 KG/M2 | WEIGHT: 230 LBS | OXYGEN SATURATION: 96 %

## 2025-04-28 DIAGNOSIS — M25.551 RIGHT HIP PAIN: Primary | ICD-10-CM

## 2025-04-28 PROCEDURE — 6360000002 HC RX W HCPCS: Performed by: PHYSICIAN ASSISTANT

## 2025-04-28 PROCEDURE — 99284 EMERGENCY DEPT VISIT MOD MDM: CPT

## 2025-04-28 PROCEDURE — 96372 THER/PROPH/DIAG INJ SC/IM: CPT

## 2025-04-28 PROCEDURE — 73502 X-RAY EXAM HIP UNI 2-3 VIEWS: CPT

## 2025-04-28 PROCEDURE — 6370000000 HC RX 637 (ALT 250 FOR IP): Performed by: PHYSICIAN ASSISTANT

## 2025-04-28 RX ORDER — MORPHINE SULFATE 4 MG/ML
4 INJECTION, SOLUTION INTRAMUSCULAR; INTRAVENOUS
Status: COMPLETED | OUTPATIENT
Start: 2025-04-28 | End: 2025-04-28

## 2025-04-28 RX ORDER — PREDNISONE 20 MG/1
20 TABLET ORAL DAILY
Qty: 5 TABLET | Refills: 0 | Status: SHIPPED | OUTPATIENT
Start: 2025-04-28 | End: 2025-05-03

## 2025-04-28 RX ORDER — CYCLOBENZAPRINE HCL 10 MG
10 TABLET ORAL
Status: COMPLETED | OUTPATIENT
Start: 2025-04-28 | End: 2025-04-28

## 2025-04-28 RX ORDER — CYCLOBENZAPRINE HCL 5 MG
5 TABLET ORAL 2 TIMES DAILY PRN
Qty: 10 TABLET | Refills: 0 | Status: SHIPPED | OUTPATIENT
Start: 2025-04-28 | End: 2025-05-08

## 2025-04-28 RX ADMIN — CYCLOBENZAPRINE HYDROCHLORIDE 10 MG: 10 TABLET, FILM COATED ORAL at 04:32

## 2025-04-28 RX ADMIN — MORPHINE SULFATE 4 MG: 4 INJECTION INTRAVENOUS at 03:17

## 2025-04-28 RX ADMIN — PREDNISONE 60 MG: 50 TABLET ORAL at 04:29

## 2025-04-28 ASSESSMENT — PAIN - FUNCTIONAL ASSESSMENT: PAIN_FUNCTIONAL_ASSESSMENT: 0-10

## 2025-04-28 ASSESSMENT — PAIN SCALES - GENERAL: PAINLEVEL_OUTOF10: 9

## 2025-04-28 ASSESSMENT — LIFESTYLE VARIABLES
HOW OFTEN DO YOU HAVE A DRINK CONTAINING ALCOHOL: MONTHLY OR LESS
HOW MANY STANDARD DRINKS CONTAINING ALCOHOL DO YOU HAVE ON A TYPICAL DAY: 1 OR 2

## 2025-04-28 ASSESSMENT — PAIN DESCRIPTION - DESCRIPTORS: DESCRIPTORS: ACHING

## 2025-04-28 ASSESSMENT — PAIN DESCRIPTION - PAIN TYPE: TYPE: ACUTE PAIN

## 2025-04-28 ASSESSMENT — PAIN DESCRIPTION - ORIENTATION: ORIENTATION: RIGHT

## 2025-04-28 ASSESSMENT — PAIN DESCRIPTION - LOCATION: LOCATION: HIP

## 2025-04-28 NOTE — ED TRIAGE NOTES
Arrived via [facility], coming from home, C/o Right hip pain x 2 hours, denies numbness or tingling in RLE, No falls or known injuries
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
## 2025-04-28 NOTE — DISCHARGE INSTRUCTIONS
As discussed, your x-ray did not reveal any acute abnormality.  Please follow-up with your primary care doctor.

## 2025-05-09 ENCOUNTER — HOSPITAL ENCOUNTER (OUTPATIENT)
Dept: ULTRASOUND IMAGING | Age: 60
Discharge: HOME | End: 2025-05-11
Attending: STUDENT IN AN ORGANIZED HEALTH CARE EDUCATION/TRAINING PROGRAM
Payer: COMMERCIAL

## 2025-05-09 DIAGNOSIS — E04.1 THYROID NODULE: ICD-10-CM

## 2025-05-09 PROCEDURE — 76536 US EXAM OF HEAD AND NECK: CPT

## 2025-05-12 ENCOUNTER — RESULTS FOLLOW-UP (OUTPATIENT)
Dept: ENT CLINIC | Age: 60
End: 2025-05-12

## 2025-05-12 DIAGNOSIS — E04.1 THYROID NODULE: Primary | ICD-10-CM

## 2025-05-13 ENCOUNTER — OFFICE VISIT (OUTPATIENT)
Dept: ENT CLINIC | Age: 60
End: 2025-05-13
Payer: COMMERCIAL

## 2025-05-13 VITALS — RESPIRATION RATE: 17 BRPM | WEIGHT: 230 LBS | BODY MASS INDEX: 36.1 KG/M2 | HEIGHT: 67 IN

## 2025-05-13 DIAGNOSIS — R22.0 FACIAL MASS: Primary | ICD-10-CM

## 2025-05-13 PROCEDURE — 21012 EXC FACE LES SBQ 2 CM/>: CPT | Performed by: STUDENT IN AN ORGANIZED HEALTH CARE EDUCATION/TRAINING PROGRAM

## 2025-05-13 RX ORDER — CYANOCOBALAMIN 1000 UG/ML
INJECTION, SOLUTION INTRAMUSCULAR; SUBCUTANEOUS
COMMUNITY
Start: 2025-04-30

## 2025-05-13 RX ORDER — LIDOCAINE 50 MG/G
PATCH TOPICAL
COMMUNITY

## 2025-05-13 RX ORDER — ALBUTEROL SULFATE 90 UG/1
INHALANT RESPIRATORY (INHALATION)
COMMUNITY
Start: 2025-05-06

## 2025-05-13 RX ORDER — SEMAGLUTIDE 0.68 MG/ML
INJECTION, SOLUTION SUBCUTANEOUS
COMMUNITY

## 2025-05-13 ASSESSMENT — ENCOUNTER SYMPTOMS
SINUS PRESSURE: 0
WHEEZING: 0
DIARRHEA: 0
STRIDOR: 0
EYE PAIN: 0
COUGH: 0
EYE DISCHARGE: 0
SHORTNESS OF BREATH: 0
EYE ITCHING: 0
SINUS PAIN: 0
CONSTIPATION: 0
FACIAL SWELLING: 0
APNEA: 0
NAUSEA: 0
CHOKING: 0

## 2025-05-13 NOTE — PROGRESS NOTES
[facility] Office Note    Patient: Beatris Sanches  MRN: [id number]  : 1965  Gender:  female  Vital Signs: Resp 17   Ht 1.702 m (5' 7\")   Wt 104.3 kg (230 lb)   BMI 36.02 kg/m²   Date: 2025    CC:   Chief Complaint   Patient presents with    Other     OFFICE PROCEDURE - excision of left preauricular cyst         HPI:  Beatris Sanches is a 59 y.o. female here for excision of left preauricular cyst.    Past Medical History, Past Surgical History, Family history, Social History, and Medications were all reviewed with the patient today and updated as necessary.     Allergies   Allergen Reactions    Lisinopril Other (See Comments)     Swell up     There is no problem list on file for this patient.    Current Outpatient Medications   Medication Sig    cyanocobalamin 1000 MCG/ML injection INJECT 1 ML SUBCUTANEOUSLY ONCE EVERY MONTH    lidocaine (LIDODERM) 5 % PLACE 1 PATCH ONTO THE SKIN DAILY FOR 10 DAYS (12 HOURS ON, 12 HOURS OFF)    OZEMPIC, 0.25 OR 0.5 MG/DOSE, 2 MG/3ML SOPN Inject by subcutaneous route for 84 days.    albuterol sulfate HFA (PROVENTIL;VENTOLIN;PROAIR) 108 (90 Base) MCG/ACT inhaler INHALE 2 PUFFS BY MOUTH EVERY 4 HOURS AS NEEDED FOR WHEEZING AND FOR SHORTNESS OF BREATH    tiZANidine (ZANAFLEX) 2 MG tablet Take 1 tablet by mouth 3 times daily as needed (Muscle spasm)    albuterol (PROVENTIL) (2.5 MG/3ML) 0.083% nebulizer solution Inhale 3 mL by nebulization route.    atorvastatin (LIPITOR) 40 MG tablet Take 1 tablet by mouth daily    B Complex-C-Folic Acid (JULIANN-SIDNEY RX) 1 MG TABS Take 1 tablet by mouth daily    vitamin D3 (CHOLECALCIFEROL) 125 MCG (5000 UT) TABS tablet Take 1 tablet by mouth daily    Cinnamon 500 MG CAPS Take 1 capsule by mouth daily    doxycycline hyclate (VIBRAMYCIN) 100 MG capsule TAKE 1 CAPSULE BY MOUTH TWICE DAILY FOR 14 DAYS    fexofenadine (ALLEGRA) 180 MG tablet Take 1 tablet by mouth daily    NEURONTIN 400 MG capsule Take 1 capsule every day by oral route.

## 2025-05-14 ENCOUNTER — TELEPHONE (OUTPATIENT)
Dept: INTERVENTIONAL RADIOLOGY/VASCULAR | Age: 60
End: 2025-05-14

## 2025-05-14 NOTE — TELEPHONE ENCOUNTER
[] Phone call to: Patient    [] Number used to reach this person: [phone]    [] Voicemail reached: Detailed Voicemail     [] Appointment date:  5/21/25    [] Arrival time:  1230    [] Location given: yes    [] AM Medicine with a small sip of water: Yes    [] Patient is NPO: No    [] Need for : No    [] Anesthetic Local    [] Blood thinners held: No    [] Education on Hold requirements prior to procedure: NA     [] Allergies: OTHER: see list    [] Reviewed    [] Latex Allergy: No    [] Lidocaine Allergy: No    [] CPAP at night: No    [] Any recent infections: No    [] Diabetes: No    [] Additional information:  NA      [] Time taken to answer all questions    [] Call back phone number of [phone] given

## 2025-05-21 ENCOUNTER — HOSPITAL ENCOUNTER (OUTPATIENT)
Dept: ULTRASOUND IMAGING | Age: 60
Discharge: HOME | End: 2025-05-23
Attending: STUDENT IN AN ORGANIZED HEALTH CARE EDUCATION/TRAINING PROGRAM
Payer: COMMERCIAL

## 2025-05-21 VITALS
SYSTOLIC BLOOD PRESSURE: 160 MMHG | TEMPERATURE: 98.1 F | DIASTOLIC BLOOD PRESSURE: 87 MMHG | HEART RATE: 78 BPM | RESPIRATION RATE: 19 BRPM | OXYGEN SATURATION: 96 %

## 2025-05-21 DIAGNOSIS — E04.1 THYROID NODULE: ICD-10-CM

## 2025-05-21 PROCEDURE — 10005 FNA BX W/US GDN 1ST LES: CPT

## 2025-05-21 PROCEDURE — 76942 ECHO GUIDE FOR BIOPSY: CPT

## 2025-05-21 PROCEDURE — 88173 CYTOPATH EVAL FNA REPORT: CPT

## 2025-05-21 PROCEDURE — 6360000002 HC RX W HCPCS: Performed by: PHYSICIAN ASSISTANT

## 2025-05-21 RX ORDER — LIDOCAINE HYDROCHLORIDE 10 MG/ML
INJECTION, SOLUTION EPIDURAL; INFILTRATION; INTRACAUDAL; PERINEURAL PRN
Status: DISCONTINUED | OUTPATIENT
Start: 2025-05-21 | End: 2025-05-24 | Stop reason: HOSPADM

## 2025-05-21 RX ADMIN — LIDOCAINE HYDROCHLORIDE 2 ML: 10 INJECTION, SOLUTION EPIDURAL; INFILTRATION; INTRACAUDAL; PERINEURAL at 13:26

## 2025-05-21 ASSESSMENT — PAIN SCALES - GENERAL: PAINLEVEL_OUTOF10: 0

## 2025-05-21 NOTE — DISCHARGE INSTRUCTIONS
If you have any questions about your procedure, please call the Interventional Radiology department at [phone].     After business hours (5pm) and weekends, call the answering service at [phone] and ask for the Interventional Radiologist on call to be paged.

## 2025-05-21 NOTE — OP NOTE
PROCEDURE: Ultrasound-guided fine-needle aspiration    Procedural Personnel  : Vanessa Davila PA-C.    Supervising physician: Eloy Mark MD.    The physician attests to supervising this procedure and agrees with the images  and report as written.    Procedure Date (mm/dd/yyyy): 5/21/2025 2:20 PM    Pre-procedure diagnosis: Thyroid nodule  Post-procedure diagnosis: Same  Indication: Histopathologic diagnosis  Previous biopsy of same target: No  Additional clinical history: None    Complications: No immediate complications   Impression:       Ultrasound-guided fine-needle aspiration biopsy of right thyroid nodule.    Plan:    Specimen(s) sent for evaluation.  _______________________________________________________________    PROCEDURE SUMMARY:  - Percutaneous US-guided thyroid nodule fine needle aspiration.  - Additional procedure(s): None    PROCEDURE DETAILS:    Pre-procedure  Consent: Informed consent for the procedure including risks, benefits and  alternatives was obtained and time-out was performed prior to the procedure.  Preparation: The site was prepared and draped using maximal sterile barrier  technique including cutaneous antisepsis.    Anesthesia/sedation  Level of anesthesia/sedation: Local    Imaging prior to biopsy  The patient was positioned supine. Initial imaging was performed.  Target:  - Organ or target location: Right lobe of the thyroid gland.  - Maximal diameter (cm): 2.1  Other findings: None    4 fine-needle aspirations were obtained using 25 and 22-gauge needles.    Permanent ultrasound images were obtained and stored in PACS.    A sterile dressing was applied.    Imaging following biopsy  Immediate post-biopsy ultrasound was performed.  Post-biopsy imaging findings: No hematoma

## 2025-05-29 ENCOUNTER — HOSPITAL ENCOUNTER (EMERGENCY)
Age: 60
Discharge: HOME | End: 2025-05-29
Payer: COMMERCIAL

## 2025-05-29 ENCOUNTER — APPOINTMENT (OUTPATIENT)
Dept: GENERAL RADIOLOGY | Age: 60
End: 2025-05-29
Payer: COMMERCIAL

## 2025-05-29 VITALS
OXYGEN SATURATION: 97 % | WEIGHT: 230 LBS | HEART RATE: 77 BPM | SYSTOLIC BLOOD PRESSURE: 121 MMHG | BODY MASS INDEX: 36.1 KG/M2 | RESPIRATION RATE: 16 BRPM | HEIGHT: 67 IN | DIASTOLIC BLOOD PRESSURE: 81 MMHG | TEMPERATURE: 98.4 F

## 2025-05-29 DIAGNOSIS — M25.551 CHRONIC RIGHT HIP PAIN: Primary | ICD-10-CM

## 2025-05-29 DIAGNOSIS — Z86.2 HISTORY OF AUTOIMMUNE DISEASE: ICD-10-CM

## 2025-05-29 DIAGNOSIS — G89.29 CHRONIC RIGHT HIP PAIN: Primary | ICD-10-CM

## 2025-05-29 PROCEDURE — 96372 THER/PROPH/DIAG INJ SC/IM: CPT

## 2025-05-29 PROCEDURE — 99284 EMERGENCY DEPT VISIT MOD MDM: CPT

## 2025-05-29 PROCEDURE — 6360000002 HC RX W HCPCS

## 2025-05-29 PROCEDURE — 73502 X-RAY EXAM HIP UNI 2-3 VIEWS: CPT

## 2025-05-29 PROCEDURE — 6370000000 HC RX 637 (ALT 250 FOR IP)

## 2025-05-29 RX ORDER — DEXAMETHASONE SODIUM PHOSPHATE 10 MG/ML
10 INJECTION, SOLUTION INTRA-ARTICULAR; INTRALESIONAL; INTRAMUSCULAR; INTRAVENOUS; SOFT TISSUE ONCE
Status: COMPLETED | OUTPATIENT
Start: 2025-05-29 | End: 2025-05-29

## 2025-05-29 RX ORDER — PREDNISONE 50 MG/1
50 TABLET ORAL DAILY
Qty: 5 TABLET | Refills: 0 | Status: SHIPPED | OUTPATIENT
Start: 2025-05-29 | End: 2025-06-03

## 2025-05-29 RX ORDER — OXYCODONE AND ACETAMINOPHEN 10; 325 MG/1; MG/1
1 TABLET ORAL
Refills: 0 | Status: COMPLETED | OUTPATIENT
Start: 2025-05-29 | End: 2025-05-29

## 2025-05-29 RX ORDER — OXYCODONE AND ACETAMINOPHEN 10; 325 MG/1; MG/1
1 TABLET ORAL EVERY 6 HOURS PRN
Qty: 12 TABLET | Refills: 0 | Status: SHIPPED | OUTPATIENT
Start: 2025-05-29 | End: 2025-06-01

## 2025-05-29 RX ADMIN — OXYCODONE HYDROCHLORIDE AND ACETAMINOPHEN 1 TABLET: 10; 325 TABLET ORAL at 01:45

## 2025-05-29 RX ADMIN — DEXAMETHASONE SODIUM PHOSPHATE 10 MG: 10 INJECTION INTRAMUSCULAR; INTRAVENOUS at 01:46

## 2025-05-29 ASSESSMENT — PAIN SCALES - GENERAL
PAINLEVEL_OUTOF10: 8
PAINLEVEL_OUTOF10: 8

## 2025-05-29 ASSESSMENT — PAIN DESCRIPTION - DESCRIPTORS: DESCRIPTORS: ACHING

## 2025-05-29 ASSESSMENT — PAIN DESCRIPTION - ORIENTATION: ORIENTATION: RIGHT

## 2025-05-29 ASSESSMENT — PAIN DESCRIPTION - LOCATION: LOCATION: HIP

## 2025-05-29 ASSESSMENT — PAIN - FUNCTIONAL ASSESSMENT: PAIN_FUNCTIONAL_ASSESSMENT: 0-10

## 2025-05-29 NOTE — ED PROVIDER NOTES
Emergency Department Provider Note       [facility] EMERGENCY DEPT   PCP: Lopez Yin MD   Age: 59 y.o.   Sex: female     DISPOSITION Decision To Discharge 05/29/2025 03:27:19 AM    ICD-10-CM    1. Chronic right hip pain  M25.551 oxyCODONE-acetaminophen (PERCOCET)  MG per tablet    G89.29 [facility]      2. History of autoimmune disease  Z86.2 [facility]          Medical Decision Making     Patient is a 59-year-old female with past medical history of an undifferentiated connective tissue disease, Sjogren's syndrome presenting with acute on chronic hip pain.  Patient has been experiencing intermittent episodes of right hip pain over the past few months.    On presentation, patient is afebrile, vital signs are stable, and she is well-appearing in no acute distress.  She is able to ambulate with discomfort.  She has tenderness to palpation to her posterior and lateral right hip with decreased range of motion due to pain.  No overlying redness, warmth, streaking, or signs of infection.  She has 2+ palpable dorsalis pedis pulses with brisk capillary refill, no pallor or coolness to the touch.  She does not have any saddle anesthesia or changes to bowel or bladder function.    Will get a repeat x-ray to check for any worsening arthritis or AVN.  I do not see any need for lab work as her vital signs are stable she does not have any signs of infection, she does not have any symptoms of cauda equina, no arterial insufficiency or any other abnormalities.  Will provide her with pain control and a shot of Decadron and reevaluate.    X-ray unremarkable.  Patient feels improved after medications.  As this patient's pain has been ongoing for many months, she has no sign of fever, arterial occlusion, septic joint, or any other emergent pathologies, plan for this patient is continued outpatient management.  I will place a referral into the orthopedic for her to follow-up with  as well as a new rheumatologist as she is requesting.  Will start her on a course of steroids and send her home with a short course of pain medication.  She was given strict return precautions.  Patient and  verbalized understanding with plan of care and left facility in stable condition.     1 chronic illness with exacerbation.  1 acute complicated illness or injury.  Prescription drug management performed.  Shared medical decision making was utilized in creating the patients health plan today.  I independently ordered and reviewed each unique test.    I reviewed external records: ED visit note from a different ED.   I reviewed external records: provider visit note from PCP.  I reviewed external records: provider visit note from outside specialist.  I reviewed external records: previous lab results from outside ED.  I reviewed external records: previous imaging study including radiologist interpretation.       I interpreted the X-rays no evidence of fracture.              History     Patient is a 59-year-old female with past medical history of an undifferentiated connective tissue disease, Sjogren's syndrome presenting with acute on chronic hip pain.  Patient has been experiencing intermittent episodes of right hip pain over the past few months.  She was last evaluated at our facility 1 month ago for similar.  She denies any recent injury or trauma.  She is currently awaiting her follow-up appointment with Dr. Cruz with rheumatology.  She was started on Plaquenil 200 mg twice daily in March.  She states over the past few days she has had worsening right hip pain that makes it difficult for her to ambulate or lay on her right hip.  She denies any saddle anesthesia or changes to bowel or bladder function.  Denies any lower extremity weakness.  Denies any fevers, chills, or night sweats.  She denies any swelling or redness to her leg.  Denies any pallor or coolness to the touch.  Patient denies any history of

## 2025-05-29 NOTE — DISCHARGE INSTRUCTIONS
Please begin taking the steroids as prescribed.  You can continue muscle relaxers at home.  Take the pain medication only as needed for worsening pain.    Please follow-up with both the orthopedics as well as the rheumatologist, their numbers are listed below.    I have placed a referral into case management as well they should be reaching out to speak with you.

## 2025-05-29 NOTE — ED TRIAGE NOTES
Right hip pain that is worsening, has been undergoing testing for autoimmune disease but does not have results yet, unable to stand or lay on right side

## 2025-05-29 NOTE — CARE COORDINATION
Attempt to reach patient at provider request for follow up.  The number on the face sheet is [phone] - The person who answers states this is the wrong number

## 2025-06-02 ENCOUNTER — OFFICE VISIT (OUTPATIENT)
Dept: ORTHOPEDIC SURGERY | Age: 60
End: 2025-06-02
Payer: COMMERCIAL

## 2025-06-02 VITALS — HEIGHT: 67 IN | WEIGHT: 230 LBS | BODY MASS INDEX: 36.1 KG/M2

## 2025-06-02 DIAGNOSIS — M54.41 RIGHT-SIDED LOW BACK PAIN WITH RIGHT-SIDED SCIATICA, UNSPECIFIED CHRONICITY: Primary | ICD-10-CM

## 2025-06-02 PROCEDURE — 99203 OFFICE O/P NEW LOW 30 MIN: CPT | Performed by: PHYSICIAN ASSISTANT

## 2025-06-02 RX ORDER — NYSTATIN 100000 [USP'U]/ML
SUSPENSION ORAL
COMMUNITY
Start: 2025-05-21

## 2025-06-02 NOTE — PROGRESS NOTES
[facility]      Patient ID:  Name: Beatris Sanches  AGE/Gender: 59 y.o. female  MRN: [id number]  : 1965    Date of Consultation:  2025      ALLERGIES:   Allergies   Allergen Reactions    Lisinopril Other (See Comments)     Swell up        CC: Right hip pain     History:  The patient presents today as a new patient complaining primarily of right hip pain that she localizes to her buttock.  This has been bothering her off and on over the last several months.  She says the left hip started initially and then that improved and the right 1 started the last few flareups.  She has history of connective tissue disorder and has an appointment to follow-up with rheumatology on .  She says she feels like her hip locks up on her and has pain when she is getting up out of a chair or getting in and out of the car or laying at night.  She says this last flareup started her last Thursday and she was placed on a steroid Dosepak which has helped ease it up some.  She was also placed on muscle relaxers that did not help.  She denies any low back pain.  She denies any falls or injuries.  She has no other complaints or concerns.  They have no other complaints or concerns.    Review of Systems:  Pertinent items are noted in HPI.    Past Medical History Includes:   Past Medical History:   Diagnosis Date    Arthritis     Chronic obstructive pulmonary disease (HCC)     no meds    GERD (gastroesophageal reflux disease)     no meds    Hypertension     Kidney stone     Sjogren's syndrome     Sleep apnea     does not use cpap    Thyroid disease    ,   Past Surgical History:   Procedure Laterality Date    COLONOSCOPY         Family History:   Family History   Problem Relation Age of Onset    Heart Disease Mother         Social History:   Social History     Tobacco Use    Smoking status: Every Day     Current packs/day: 0.50     Average packs/day: 0.5 packs/day for 35.4 years (17.7 ttl pk-yrs)     Types:

## 2025-06-04 ENCOUNTER — TELEPHONE (OUTPATIENT)
Dept: ORTHOPEDIC SURGERY | Age: 60
End: 2025-06-04

## 2025-06-13 DIAGNOSIS — M54.41 RIGHT-SIDED LOW BACK PAIN WITH RIGHT-SIDED SCIATICA, UNSPECIFIED CHRONICITY: Primary | ICD-10-CM

## 2025-06-16 ENCOUNTER — OFFICE VISIT (OUTPATIENT)
Dept: OBGYN CLINIC | Age: 60
End: 2025-06-16
Payer: COMMERCIAL

## 2025-06-16 VITALS
BODY MASS INDEX: 37.17 KG/M2 | HEIGHT: 67 IN | WEIGHT: 236.8 LBS | DIASTOLIC BLOOD PRESSURE: 80 MMHG | SYSTOLIC BLOOD PRESSURE: 124 MMHG

## 2025-06-16 DIAGNOSIS — R87.612 LOW GRADE SQUAMOUS INTRAEPITHELIAL LESION (LGSIL) ON CERVICAL PAP SMEAR: Primary | ICD-10-CM

## 2025-06-16 PROCEDURE — 99203 OFFICE O/P NEW LOW 30 MIN: CPT | Performed by: STUDENT IN AN ORGANIZED HEALTH CARE EDUCATION/TRAINING PROGRAM

## 2025-06-16 NOTE — PROGRESS NOTES
Beatris Sanches (: 1965) is a 59 y.o. , New patient, here for evaluation of the following chief complaint(s): NP pelvic/talk      HPI:  Low grade squamous intraepithelial lesion on cytologic smear of cervix (LGSIL) this year 3/11/25. Pt does not have history of abnormal pap smears.       ASSESSMENT/PLAN:  1. Low grade squamous intraepithelial lesion (LGSIL) on cervical Pap smear  Overview:  25: pap smear LGSIL on 3/22/25, HPV testing unknown. Plan for colposcopy.  Recommend ibuprofen prior to colposcopy.  Message sent to Jeanette.    RTO for colposcopy.    OBJECTIVE:  /80   Ht 1.702 m (5' 7\")   Wt 107.4 kg (236 lb 12.8 oz)   BMI 37.09 kg/m²      Past Medical History:   Diagnosis Date    Arthritis     Chronic obstructive pulmonary disease (HCC)     no meds    GERD (gastroesophageal reflux disease)     no meds    Hypertension     Kidney stone     Sjogren's syndrome     Sleep apnea     does not use cpap    Thyroid disease       Current Outpatient Medications   Medication Sig Dispense Refill    nystatin (MYCOSTATIN) 466609 UNIT/ML suspension       tiZANidine (ZANAFLEX) 4 MG tablet TAKE 1 TABLET BY MOUTH 2 TIMES A DAY AS NEEDED FOR MUSCLE SPASMS      cyanocobalamin 1000 MCG/ML injection INJECT 1 ML SUBCUTANEOUSLY ONCE EVERY MONTH      lidocaine (LIDODERM) 5 % PLACE 1 PATCH ONTO THE SKIN DAILY FOR 10 DAYS (12 HOURS ON, 12 HOURS OFF)      OZEMPIC, 0.25 OR 0.5 MG/DOSE, 2 MG/3ML SOPN Inject by subcutaneous route for 84 days.      albuterol sulfate HFA (PROVENTIL;VENTOLIN;PROAIR) 108 (90 Base) MCG/ACT inhaler INHALE 2 PUFFS BY MOUTH EVERY 4 HOURS AS NEEDED FOR WHEEZING AND FOR SHORTNESS OF BREATH      tiZANidine (ZANAFLEX) 2 MG tablet Take 1 tablet by mouth 3 times daily as needed (Muscle spasm) 30 tablet 0    albuterol (PROVENTIL) (2.5 MG/3ML) 0.083% nebulizer solution Inhale 3 mL by nebulization route.      atorvastatin (LIPITOR) 40 MG tablet Take 1 tablet by mouth daily      B Complex-C-Folic Acid

## 2025-06-17 ENCOUNTER — TELEPHONE (OUTPATIENT)
Dept: OBGYN CLINIC | Age: 60
End: 2025-06-17

## 2025-06-23 ENCOUNTER — HOSPITAL ENCOUNTER (OUTPATIENT)
Dept: PHYSICAL THERAPY | Age: 60
Setting detail: RECURRING SERIES
Discharge: HOME | End: 2025-06-25
Payer: COMMERCIAL

## 2025-06-23 DIAGNOSIS — M54.51 VERTEBROGENIC LOW BACK PAIN: Primary | ICD-10-CM

## 2025-06-23 DIAGNOSIS — M54.41 RIGHT-SIDED LOW BACK PAIN WITH RIGHT-SIDED SCIATICA, UNSPECIFIED CHRONICITY: ICD-10-CM

## 2025-06-23 DIAGNOSIS — M62.81 MUSCLE WEAKNESS (GENERALIZED): ICD-10-CM

## 2025-06-23 DIAGNOSIS — R26.2 DIFFICULTY IN WALKING, NOT ELSEWHERE CLASSIFIED: ICD-10-CM

## 2025-06-23 PROCEDURE — 97163 PT EVAL HIGH COMPLEX 45 MIN: CPT

## 2025-06-23 ASSESSMENT — PAIN SCALES - GENERAL: PAINLEVEL_OUTOF10: 6

## 2025-06-23 NOTE — PROGRESS NOTES
Beatris Sanches  : 1965  Primary: [facility] ([employer])  Secondary:  [facility] [place] [address] [place] [address] [place] [id number]  Phone: [phone]  Fax: [phone] Plan Frequency: 2x per week for up to 90 days    Plan of Care/Certification Expiration Date: 25        Plan of Care/Certification Expiration Date:  Plan of Care/Certification Expiration Date: 25    Frequency/Duration: Plan Frequency: 2x per week for up to 90 days      Time In/Out:   Time In: 1330  Time Out: 1406      PT Visit Info:    Progress Note Counter: 1      Visit Count:  1    OUTPATIENT PHYSICAL THERAPY:   Treatment Note 2025       Episode  (LBP)               Treatment Diagnosis:    Vertebrogenic low back pain  Right-sided low back pain with right-sided sciatica, unspecified chronicity  Difficulty in walking, not elsewhere classified  Muscle weakness (generalized)  Medical/Referring Diagnosis:    Right-sided low back pain with right-sided sciatica, unspecified chronicity [M54.41]    Referring Physician:  Eloy Diaz PA MD Orders:  PT Eval and Treat   Return MD Appt:     Date of Onset:    Allergies:   Lisinopril  Restrictions/Precautions:   None      Interventions Planned (Treatment may consist of any combination of the following):     See Assessment Note    Subjective Comments:   Pt arrives today with c/o lower back pain with pain centralized to R buttock pain  Initial Pain Level:     6/10  Post Session Pain Level:      8 /10  Medications Last Reviewed: 2025  Updated Objective Findings:  See Evaluation Note from today  Treatment     MANUAL THERAPY: (0 minutes): Joint mobilization, Soft tissue mobilization and Manipulation was utilized and necessary because of the patient's restricted joint motion, painful spasm, loss of articular motion and restricted motion of soft tissue.     THERAPEUTIC EXERCISE: (0 minutes):  Exercises per grid below to improve mobility, strength

## 2025-06-23 NOTE — THERAPY EVALUATION
Beatris Sanches  : 1965  Primary: [employer] ([employer])  Secondary:  [facility] @ [place] [address] [address]  Phone: [phone]  Fax: [phone] Plan Frequency: 2x per week for up to 90 days  Plan of Care/Certification Expiration Date: 25        Plan of Care/Certification Expiration Date:  Plan of Care/Certification Expiration Date: 25    Frequency/Duration: Plan Frequency: 2x per week for up to 90 days      Time In/Out:   Time In: 1330  Time Out: 1406      PT Visit Info:    Progress Note Counter: 1      Visit Count:  1                OUTPATIENT PHYSICAL THERAPY:             Initial Assessment 2025               Episode (LBP)         Treatment Diagnosis:     Vertebrogenic low back pain  Right-sided low back pain with right-sided sciatica, unspecified chronicity  Difficulty in walking, not elsewhere classified  Muscle weakness (generalized)  Medical/Referring Diagnosis:    Right-sided low back pain with right-sided sciatica, unspecified chronicity [M54.41]    Referring Physician:  Eloy Diaz PA MD Orders:  PT Eval and Treat   Return MD Appt:  TBD by pt  Date of Onset:    Acute on Chronic  Allergies:  Lisinopril  Restrictions/Precautions:    None      Medications Last Reviewed: 2025     SUBJECTIVE   History of Injury/Illness (Reason for Referral):  Pt arrives today with c/o R sided lower back pain that radiates down into R hip. Pain is aggravated by any prolonged activity : standing, walking, sitting, lying down. Pt having a lot of difficulty performing job tasks such as mopping and sweeping. 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 Has significant difficulty walking at times when pain is at its worst. Pt report pain began in  and has been on/off for years.     Per referring provider:  The patient presents today as a new patient complaining primarily of right hip pain

## 2025-07-08 ENCOUNTER — HOSPITAL ENCOUNTER (OUTPATIENT)
Dept: PHYSICAL THERAPY | Age: 60
Setting detail: RECURRING SERIES
Discharge: HOME | End: 2025-07-10
Payer: COMMERCIAL

## 2025-07-08 PROCEDURE — 97140 MANUAL THERAPY 1/> REGIONS: CPT

## 2025-07-08 PROCEDURE — 97110 THERAPEUTIC EXERCISES: CPT

## 2025-07-08 NOTE — PROGRESS NOTES
Beatris Sanches  : 1965  Primary: [employer] ([employer])  Secondary:  [facility] @ [place] [address] [address]  Phone: [phone]  Fax: [phone] Plan Frequency: 2x per week for up to 90 days    Plan of Care/Certification Expiration Date: 25        Plan of Care/Certification Expiration Date:  Plan of Care/Certification Expiration Date: 25    Frequency/Duration: Plan Frequency: 2x per week for up to 90 days      Time In/Out:   Time In: 1501  Time Out: 1540      PT Visit Info:    Progress Note Counter: 2      Visit Count:  2    OUTPATIENT PHYSICAL THERAPY:   Treatment Note 2025       Episode  (LBP)               Treatment Diagnosis:    Vertebrogenic low back pain  Right-sided low back pain with right-sided sciatica, unspecified chronicity  Difficulty in walking, not elsewhere classified  Muscle weakness (generalized)  Medical/Referring Diagnosis:    Right-sided low back pain with right-sided sciatica, unspecified chronicity [M54.41]    Referring Physician:  Eloy Diaz PA MD Orders:  PT Eval and Treat   Return MD Appt:     Date of Onset:    Allergies:   Lisinopril  Restrictions/Precautions:   None      Interventions Planned (Treatment may consist of any combination of the following):     See Assessment Note    Subjective Comments:   Pt was having more pain putting pain through her leg and Wbing. Symptoms continue to be very irritable  Initial Pain Level:   6  /10  Post Session Pain Level:     8  /10  Medications Last Reviewed: 2025  Updated Objective Findings:  None Today  Treatment     MANUAL THERAPY: (15 minutes): Joint mobilization, Soft tissue mobilization and Manipulation was utilized and necessary because of the patient's restricted joint motion, painful spasm, loss of articular motion and restricted motion of soft tissue.   +very gentle STM to glute, piriformis  +Grade II CPA to R SIJ and sacrum    THERAPEUTIC

## 2025-07-14 ENCOUNTER — OFFICE VISIT (OUTPATIENT)
Dept: ORTHOPEDIC SURGERY | Age: 60
End: 2025-07-14
Payer: COMMERCIAL

## 2025-07-14 DIAGNOSIS — M54.41 RIGHT-SIDED LOW BACK PAIN WITH RIGHT-SIDED SCIATICA, UNSPECIFIED CHRONICITY: Primary | ICD-10-CM

## 2025-07-14 PROCEDURE — 99213 OFFICE O/P EST LOW 20 MIN: CPT | Performed by: PHYSICIAN ASSISTANT

## 2025-07-14 RX ORDER — PREGABALIN 100 MG/1
100 CAPSULE ORAL 3 TIMES DAILY
Qty: 90 CAPSULE | Refills: 0 | Status: SHIPPED | OUTPATIENT
Start: 2025-07-14 | End: 2025-08-13

## 2025-07-14 RX ORDER — MELOXICAM 15 MG/1
15 TABLET ORAL DAILY
Qty: 30 TABLET | Refills: 1 | Status: SHIPPED | OUTPATIENT
Start: 2025-07-14

## 2025-07-14 NOTE — PROGRESS NOTES
[facility]          Patient ID:  Name: Beatris Sanches  AGE/Gender: 59 y.o. female  MRN: [id number]  : 1965    Date of Consultation:  2025          ALLERGIES:   Allergies   Allergen Reactions    Lisinopril Other (See Comments)     Swell up        CC: Recheck right sided buttock pain    History:  The patient presents today for recheck of the right sided buttock pain. The patient was found to have right sided sciatica and was referred to physical therapy.  She has not noted any improvement with physical therapy.  She saw rheumatology and was felt to have a connective tissue disorder with negative lab work.  She had been on gabapentin in the past and really could not tell if this was helping.  She had also been on Plaquenil but is no longer taking this.  She is really only taking over-the-counter anti-inflammatories at this point.  She has difficulty sleeping.. They deny any new injuries.  They have no other complaints or concerns.      Review of Systems:  Pertinent items are noted in HPI.    Past Medical History Includes:   Past Medical History:   Diagnosis Date    Arthritis     Chronic obstructive pulmonary disease (HCC)     no meds    GERD (gastroesophageal reflux disease)     no meds    Hypertension     Kidney stone     Sjogren's syndrome     Sleep apnea     does not use cpap    Thyroid disease    ,   Past Surgical History:   Procedure Laterality Date    COLONOSCOPY         Family History:   Family History   Problem Relation Age of Onset    Heart Disease Mother         Social History:   Social History     Tobacco Use    Smoking status: Every Day     Current packs/day: 0.50     Average packs/day: 0.5 packs/day for 35.5 years (17.8 ttl pk-yrs)     Types: Cigarettes     Start date:     Smokeless tobacco: Never    Tobacco comments:     Quit smoking: \"Start in her 20s and smokes 1/2 ppd.\"   Substance Use Topics    Alcohol use: Yes         Physical Exam:     General:  On exam the

## 2025-07-23 ENCOUNTER — PROCEDURE VISIT (OUTPATIENT)
Dept: OBGYN CLINIC | Age: 60
End: 2025-07-23
Payer: COMMERCIAL

## 2025-07-23 ENCOUNTER — APPOINTMENT (OUTPATIENT)
Dept: PHYSICAL THERAPY | Age: 60
End: 2025-07-23
Payer: COMMERCIAL

## 2025-07-23 VITALS — HEIGHT: 67 IN | WEIGHT: 235.4 LBS | BODY MASS INDEX: 36.95 KG/M2

## 2025-07-23 DIAGNOSIS — R87.612 LOW GRADE SQUAMOUS INTRAEPITHELIAL LESION (LGSIL) ON CERVICAL PAP SMEAR: Primary | ICD-10-CM

## 2025-07-23 PROCEDURE — 57456 ENDOCERV CURETTAGE W/SCOPE: CPT | Performed by: STUDENT IN AN ORGANIZED HEALTH CARE EDUCATION/TRAINING PROGRAM

## 2025-07-23 NOTE — PROGRESS NOTES
Colposcopy Exam    Indication for Colposcopy: Low grade squamous intraepithelial lesion on cytologic smear of cervix (LGSIL) this year 3/11/25. Pt does not have history of abnormal pap smears.    Contraceptive method:  none  No obstetric history on file.  LMP:  No LMP recorded.    Tobacco Use:    Social History     Tobacco Use   Smoking Status Every Day    Current packs/day: 0.50    Average packs/day: 0.5 packs/day for 35.6 years (17.8 ttl pk-yrs)    Types: Cigarettes    Start date: 1990   Smokeless Tobacco Never   Tobacco Comments    Quit smoking: \"Start in her 20s and smokes 1/2 ppd.\"     Medications:    Current Outpatient Medications on File Prior to Visit   Medication Sig Dispense Refill    meloxicam (MOBIC) 15 MG tablet Take 1 tablet by mouth daily 30 tablet 1    pregabalin (LYRICA) 100 MG capsule Take 1 capsule by mouth 3 times daily for 30 days. Max Daily Amount: 300 mg 90 capsule 0    nystatin (MYCOSTATIN) [id number] UNIT/ML suspension       tiZANidine (ZANAFLEX) 4 MG tablet TAKE 1 TABLET BY MOUTH 2 TIMES A DAY AS NEEDED FOR MUSCLE SPASMS      cyanocobalamin 1000 MCG/ML injection INJECT 1 ML SUBCUTANEOUSLY ONCE EVERY MONTH      lidocaine (LIDODERM) 5 % PLACE 1 PATCH ONTO THE SKIN DAILY FOR 10 DAYS (12 HOURS ON, 12 HOURS OFF)      OZEMPIC, 0.25 OR 0.5 MG/DOSE, 2 MG/3ML SOPN Inject by subcutaneous route for 84 days.      albuterol sulfate HFA (PROVENTIL;VENTOLIN;PROAIR) 108 (90 Base) MCG/ACT inhaler INHALE 2 PUFFS BY MOUTH EVERY 4 HOURS AS NEEDED FOR WHEEZING AND FOR SHORTNESS OF BREATH      tiZANidine (ZANAFLEX) 2 MG tablet Take 1 tablet by mouth 3 times daily as needed (Muscle spasm) 30 tablet 0    albuterol (PROVENTIL) (2.5 MG/3ML) 0.083% nebulizer solution Inhale 3 mL by nebulization route.      atorvastatin (LIPITOR) 40 MG tablet Take 1 tablet by mouth daily      B Complex-C-Folic Acid (JULIANN-SIDNEY RX) 1 MG TABS Take 1 tablet by mouth daily      vitamin D3 (CHOLECALCIFEROL) 125 MCG (5000 UT) TABS tablet Take 1

## 2025-08-26 ENCOUNTER — HOSPITAL ENCOUNTER (OUTPATIENT)
Dept: MRI IMAGING | Age: 60
Discharge: HOME | End: 2025-08-28
Payer: COMMERCIAL

## 2025-08-26 DIAGNOSIS — M54.41 RIGHT-SIDED LOW BACK PAIN WITH RIGHT-SIDED SCIATICA, UNSPECIFIED CHRONICITY: ICD-10-CM

## 2025-08-26 PROCEDURE — 72148 MRI LUMBAR SPINE W/O DYE: CPT

## 2025-08-27 DIAGNOSIS — M54.41 RIGHT-SIDED LOW BACK PAIN WITH RIGHT-SIDED SCIATICA, UNSPECIFIED CHRONICITY: Primary | ICD-10-CM

## (undated) DEVICE — (D)PREP SKN CHLRAPRP APPL 26ML -- CONVERT TO ITEM 371833

## (undated) DEVICE — Device

## (undated) DEVICE — SUT ETHLN 3-0 18IN PS1 BLK --

## (undated) DEVICE — LARGE TEAR CROSS CUT RASP (14.0 X 7.0MM)

## (undated) DEVICE — AMD ANTIMICROBIAL GAUZE SPONGES,12 PLY USP TYPE VII, 0.2% POLYHEXAMETHYLENE BIGUANIDE HCI (PHMB): Brand: CURITY

## (undated) DEVICE — BUTTON SWITCH PENCIL BLADE ELECTRODE, HOLSTER: Brand: EDGE

## (undated) DEVICE — BANDAGE,GAUZE,CONFORMING,3"X75",STRL,LF: Brand: MEDLINE

## (undated) DEVICE — SOLUTION IV 1000ML 0.9% SOD CHL

## (undated) DEVICE — AMD ANTIMICROBIAL BANDAGE ROLL,6 PLY: Brand: KERLIX

## (undated) DEVICE — (D)GLOVE SURG TRIFLX 8.5 PWD L -- DISC BY MFR USE ITEM 302995

## (undated) DEVICE — PAD,ABDOMINAL,5"X9",STERILE,LF,1/PK: Brand: MEDLINE INDUSTRIES, INC.

## (undated) DEVICE — SUTURE MCRYL SZ 3-0 L27IN ABSRB UD L24MM PS-1 3/8 CIR PRIM Y936H

## (undated) DEVICE — REM POLYHESIVE ADULT PATIENT RETURN ELECTRODE: Brand: VALLEYLAB

## (undated) DEVICE — GLOVE SURG SZ 8.5 L11.85IN FNGR THK9.8MIL STRW LTX POLYMER

## (undated) DEVICE — STERILE HOOK LOCK LATEX FREE ELASTIC BANDAGE 4INX5YD: Brand: HOOK LOCK™

## (undated) DEVICE — DISPOSABLE TOURNIQUET CUFF SINGLE BLADDER, DUAL PORT AND QUICK CONNECT CONNECTOR: Brand: COLOR CUFF

## (undated) DEVICE — DRSG GZ OIL EMUL CURAD 3X8 --